# Patient Record
Sex: MALE | Race: WHITE | Employment: UNEMPLOYED | RURAL
[De-identification: names, ages, dates, MRNs, and addresses within clinical notes are randomized per-mention and may not be internally consistent; named-entity substitution may affect disease eponyms.]

---

## 2017-06-26 ENCOUNTER — OFFICE VISIT (OUTPATIENT)
Dept: FAMILY MEDICINE CLINIC | Age: 57
End: 2017-06-26

## 2017-06-26 VITALS
TEMPERATURE: 97.7 F | SYSTOLIC BLOOD PRESSURE: 146 MMHG | HEART RATE: 58 BPM | DIASTOLIC BLOOD PRESSURE: 87 MMHG | HEIGHT: 68 IN | WEIGHT: 172 LBS | OXYGEN SATURATION: 98 % | RESPIRATION RATE: 16 BRPM | BODY MASS INDEX: 26.07 KG/M2

## 2017-06-26 DIAGNOSIS — Z11.59 NEED FOR HEPATITIS C SCREENING TEST: ICD-10-CM

## 2017-06-26 DIAGNOSIS — I10 ESSENTIAL HYPERTENSION: Primary | ICD-10-CM

## 2017-06-26 DIAGNOSIS — K76.89 LIVER CYST: ICD-10-CM

## 2017-06-26 NOTE — PATIENT INSTRUCTIONS

## 2017-06-26 NOTE — MR AVS SNAPSHOT
Visit Information Date & Time Provider Department Dept. Phone Encounter #  
 6/26/2017  9:00 AM Nya Pinedo, [address] Taylor [id number] Upcoming Health Maintenance Date Due Hepatitis C Screening 1960 COLONOSCOPY 7/18/1978 DTaP/Tdap/Td series (1 - Tdap) 7/18/1981 INFLUENZA AGE 9 TO ADULT 8/1/2017 Allergies as of 6/26/2017  Review Complete On: 6/26/2017 By: Isamar Gaxiola LPN Severity Noted Reaction Type Reactions Codeine  02/02/2007    Nausea and Vomiting Current Immunizations  Reviewed on 11/11/2011 No immunizations on file. Not reviewed this visit You Were Diagnosed With   
  
 Codes Comments Essential hypertension    -  Primary ICD-10-CM: I10 
ICD-9-CM: 401.9 Liver cyst     ICD-10-CM: K76.89 
ICD-9-CM: 573.8 Need for hepatitis C screening test     ICD-10-CM: Z11.59 
ICD-9-CM: V73.89 Vitals BP Pulse Temp Resp Height(growth percentile) Weight(growth percentile) 146/87 (BP 1 Location: Right arm, BP Patient Position: Sitting) (!) 58 97.7 °F (36.5 °C) (Oral) 16 5' 8\" (1.727 m) 172 lb (78 kg) SpO2 BMI Smoking Status 98% 26.15 kg/m2 Never Smoker Vitals History BMI and BSA Data Body Mass Index Body Surface Area  
 26.15 kg/m 2 1.93 m 2 Preferred Pharmacy Pharmacy Name Phone [facility] [address] [place] [address] [phone] Your Updated Medication List  
  
   
This list is accurate as of: 6/26/17 10:16 AM.  Always use your most recent med list.  
  
  
  
  
 lisinopril 30 mg tablet Commonly known as:  Barbarann Plunk Take 1 Tab by mouth daily. We Performed the Following HEPATITIS C AB [37718 CPT(R)] METABOLIC PANEL, COMPREHENSIVE [30931 CPT(R)] To-Do List   
 06/26/2017 Imaging:  US ABD LTD Patient Instructions DASH Diet: Care Instructions Your Care Instructions The DASH diet is an eating plan that can help lower your blood pressure. DASH stands for Dietary Approaches to Stop Hypertension. Hypertension is high blood pressure. The DASH diet focuses on eating foods that are high in calcium, potassium, and magnesium. These nutrients can lower blood pressure. The foods that are highest in these nutrients are fruits, vegetables, low-fat dairy products, nuts, seeds, and legumes. But taking calcium, potassium, and magnesium supplements instead of eating foods that are high in those nutrients does not have the same effect. The DASH diet also includes whole grains, fish, and poultry. The DASH diet is one of several lifestyle changes your doctor may recommend to lower your high blood pressure. Your doctor may also want you to decrease the amount of sodium in your diet. Lowering sodium while following the DASH diet can lower blood pressure even further than just the DASH diet alone. Follow-up care is a key part of your treatment and safety. Be sure to make and go to all appointments, and call your doctor if you are having problems. It's also a good idea to know your test results and keep a list of the medicines you take. How can you care for yourself at home? Following the DASH diet · Eat 4 to 5 servings of fruit each day. A serving is 1 medium-sized piece of fruit, ½ cup chopped or canned fruit, 1/4 cup dried fruit, or 4 ounces (½ cup) of fruit juice. Choose fruit more often than fruit juice. · Eat 4 to 5 servings of vegetables each day. A serving is 1 cup of lettuce or raw leafy vegetables, ½ cup of chopped or cooked vegetables, or 4 ounces (½ cup) of vegetable juice. Choose vegetables more often than vegetable juice. · Get 2 to 3 servings of low-fat and fat-free dairy each day. A serving is 8 ounces of milk, 1 cup of yogurt, or 1 ½ ounces of cheese. · Eat 6 to 8 servings of grains each day.  A serving is 1 slice of bread, 1 ounce of dry cereal, or ½ cup of cooked rice, pasta, or cooked cereal. Try to choose whole-grain products as much as possible. · Limit lean meat, poultry, and fish to 2 servings each day. A serving is 3 ounces, about the size of a deck of cards. · Eat 4 to 5 servings of nuts, seeds, and legumes (cooked dried beans, lentils, and split peas) each week. A serving is 1/3 cup of nuts, 2 tablespoons of seeds, or ½ cup of cooked beans or peas. · Limit fats and oils to 2 to 3 servings each day. A serving is 1 teaspoon of vegetable oil or 2 tablespoons of salad dressing. · Limit sweets and added sugars to 5 servings or less a week. A serving is 1 tablespoon jelly or jam, ½ cup sorbet, or 1 cup of lemonade. · Eat less than 2,300 milligrams (mg) of sodium a day. If you limit your sodium to 1,500 mg a day, you can lower your blood pressure even more. Tips for success · Start small. Do not try to make dramatic changes to your diet all at once. You might feel that you are missing out on your favorite foods and then be more likely to not follow the plan. Make small changes, and stick with them. Once those changes become habit, add a few more changes. · Try some of the following: ¨ Make it a goal to eat a fruit or vegetable at every meal and at snacks. This will make it easy to get the recommended amount of fruits and vegetables each day. ¨ Try yogurt topped with fruit and nuts for a snack or healthy dessert. ¨ Add lettuce, tomato, cucumber, and onion to sandwiches. ¨ Combine a ready-made pizza crust with low-fat mozzarella cheese and lots of vegetable toppings. Try using tomatoes, squash, spinach, broccoli, carrots, cauliflower, and onions. ¨ Have a variety of cut-up vegetables with a low-fat dip as an appetizer instead of chips and dip. ¨ Sprinkle sunflower seeds or chopped almonds over salads. Or try adding chopped walnuts or almonds to cooked vegetables. ¨ Try some vegetarian meals using beans and peas. Add garbanzo or kidney beans to salads. Make burritos and tacos with mashed quiñones beans or black beans. Where can you learn more? Go to http://lorna-galilea.info/. Enter W584 in the search box to learn more about \"DASH Diet: Care Instructions. \" Current as of: April 3, 2017 Content Version: 11.3 © 7355-5163 [employer]. Care instructions adapted under license by [employer] (which disclaims liability or warranty for this information). If you have questions about a medical condition or this instruction, always ask your healthcare professional. [address] any warranty or liability for your use of this information. Introducing [facility]! Martha Quintero introduces [employer] patient portal. Now you can access parts of your medical record, email your doctor's office, and request medication refills online. 1. In your internet browser, go to [email or web address] 2. Click on the First Time User? Click Here link in the Sign In box. You will see the New Member Sign Up page. 3. Enter your [employer] Access Code exactly as it appears below. You will not need to use this code after youve completed the sign-up process. If you do not sign up before the expiration date, you must request a new code. · [employer] Access Code: [email or web address] Expires: 9/24/2017  8:57 AM 
 
4. Enter the last four digits of your Social Security Number (xxxx) and Date of Birth (mm/dd/yyyy) as indicated and click Submit. You will be taken to the next sign-up page. 5. Create a [employer] ID. This will be your [employer] login ID and cannot be changed, so think of one that is secure and easy to remember. 6. Create a [employer] password. You can change your password at any time. 7. Enter your Password Reset Question and Answer. This can be used at a later time if you forget your password. 8. Enter your e-mail address. You will receive e-mail notification when new information is available in [address]. 9. Click Sign Up. You can now view and download portions of your medical record. 10. Click the Download Summary menu link to download a portable copy of your medical information. If you have questions, please visit the Frequently Asked Questions section of the [employer] website. Remember, [employer] is NOT to be used for urgent needs. For medical emergencies, dial 911. Now available from your iPhone and Android! Please provide this summary of care documentation to your next provider. If you have any questions after today's visit, please call [phone].

## 2017-06-26 NOTE — PROGRESS NOTES
Reviewed record in preparation for visit and have necessary documentation  Pt did not bring medication to office visit for review  opportunity was given for questions  Goals that were addressed and/or need to be completed during or after this appointment include    Health Maintenance Due   Topic Date Due    Hepatitis C Screening  1960    COLONOSCOPY  07/18/1978    DTaP/Tdap/Td series (1 - Tdap) 07/18/1981

## 2017-06-27 LAB
ALBUMIN SERPL-MCNC: 4.3 G/DL (ref 3.5–5.5)
ALBUMIN/GLOB SERPL: 1.4 {RATIO} (ref 1.2–2.2)
ALP SERPL-CCNC: 53 IU/L (ref 39–117)
ALT SERPL-CCNC: 13 IU/L (ref 0–44)
AST SERPL-CCNC: 19 IU/L (ref 0–40)
BILIRUB SERPL-MCNC: 0.7 MG/DL (ref 0–1.2)
BUN SERPL-MCNC: 14 MG/DL (ref 6–24)
BUN/CREAT SERPL: 15 (ref 9–20)
CALCIUM SERPL-MCNC: 9.5 MG/DL (ref 8.7–10.2)
CHLORIDE SERPL-SCNC: 100 MMOL/L (ref 96–106)
CO2 SERPL-SCNC: 27 MMOL/L (ref 18–29)
CREAT SERPL-MCNC: 0.96 MG/DL (ref 0.76–1.27)
GLOBULIN SER CALC-MCNC: 3 G/DL (ref 1.5–4.5)
GLUCOSE SERPL-MCNC: 82 MG/DL (ref 65–99)
HCV AB S/CO SERPL IA: <0.1 S/CO RATIO (ref 0–0.9)
POTASSIUM SERPL-SCNC: 4.6 MMOL/L (ref 3.5–5.2)
PROT SERPL-MCNC: 7.3 G/DL (ref 6–8.5)
SODIUM SERPL-SCNC: 142 MMOL/L (ref 134–144)

## 2017-06-30 NOTE — PROGRESS NOTES
Patient: Leopold Fent MRN: [id number]  SSN: [id number]    YOB: 1960  Age: 64 y.o. Sex: male        Subjective:     Chief Complaint   Patient presents with    Hypertension    GERD    Kidney Stone     recently went to urology        HPI: he is a 64y.o. year old male who presents for follow up of HTN, GERD and hepatic cyst. Has had presumed hepatic cyst identified on CT. Patient denies HA, dizziness, SOB, CP, abdominal pain, dysuria, myalgias or arthralgias. Says he had procedure done many years ago on right flank. Encounter Diagnoses   Name Primary?  Essential hypertension Yes    Liver cyst     Need for hepatitis C screening test        BP Readings from Last 3 Encounters:   06/26/17 146/87   12/28/16 122/86   11/25/16 120/75       Wt Readings from Last 3 Encounters:   06/26/17 172 lb (78 kg)   12/28/16 173 lb 12.8 oz (78.8 kg)   11/25/16 175 lb (79.4 kg)     Body mass index is 26.15 kg/(m^2). Current and past medical information:    Current Medications after this visit[de-identified]     Current Outpatient Prescriptions   Medication Sig    lisinopril (PRINIVIL, ZESTRIL) 30 mg tablet Take 1 Tab by mouth daily. No current facility-administered medications for this visit. Patient Active Problem List    Diagnosis Date Noted    Vitamin D deficiency 11/20/2014    GERD (gastroesophageal reflux disease) 11/14/2012    HTN (hypertension) 05/24/2010       Past Medical History:   Diagnosis Date    HTN (hypertension) 5/24/2010       Allergies   Allergen Reactions    Codeine Nausea and Vomiting       No past surgical history on file.     Social History     Social History    Marital status:      Spouse name: N/A    Number of children: N/A    Years of education: N/A     Social History Main Topics    Smoking status: Never Smoker    Smokeless tobacco: Never Used    Alcohol use No    Drug use: No    Sexual activity: Not Asked     Other Topics Concern    None     Social History Narrative         Objective:     Review of Systems:  Constitutional: Negative for fatigue or malaise  Derm: Negative for rash or lesion  HEENT: Negative for acute hearing or vision changes  Cardiovascular: Negative for dizziness, chest pain or palpitations  Respiratory: Negative for cough, wheezing or SOB  Gastreintestinal: see HPI, Negative for nausea or abdominal pain  Genital/urinary: see HPI, Negative for dysuria or voiding dysfunction  Muscoloskeletal: Negative for acute myalgias or arthralgias   Neurological: Negative for headache, weakness or paresthesia  Psychological: Negative for depression or anxiety      Vitals:    06/26/17 0931 06/26/17 1015   BP: (!) 163/93 146/87   Pulse: (!) 58    Resp: 16    Temp: 97.7 °F (36.5 °C)    TempSrc: Oral    SpO2: 98%    Weight: 172 lb (78 kg)    Height: 5' 8\" (1.727 m)       Body mass index is 26.15 kg/(m^2). Physical Exam:  Constitutional: well developed, well nourished, in no acute distress  Skin: warm and dry, normal tone and turgor  Head: normocephalic, atraumatic  Eyes: sclera clear, EOMI, PERRL  Neck: normal range of motion  Cardiovascular: normal S1, S2, regular rate and rhythm  Respiratory: clear to auscultation bilaterally with symmetrical effort  Abdomen: soft, BS normal  Extremities: full range of motion  Neurology: normal strength and sensation  Psych: active, alert and oriented, affect appropriate       Health Maintenance Due   Topic Date Due    COLONOSCOPY  07/18/1978    DTaP/Tdap/Td series (1 - Tdap) 07/18/1981       Risk, benefits and potential costs of recommended health maintenance discussed. Patient expressed understanding and declined at this time. Assessment and orders:       ICD-10-CM ICD-9-CM    1. Essential hypertension C64 646.1 METABOLIC PANEL, COMPREHENSIVE   2. Liver cyst K76.89 573.8 US ABD LTD   3.  Need for hepatitis C screening test Z11.59 V73.89 HEPATITIS C AB         Plan of care:  Diagnoses were discussed in detail with patient. Medication risks/benefits/side effects discussed with patient. All of the patient's questions were addressed and answered to apparent satisfaction. The patient understands and agrees with our plan of care. The patient knows to call back if they have questions about the plan of care or if symptoms change. The patient received an After-Visit Summary which contains VS, diagnoses, orders, allergy and medication lists. Over half of the 25 minutes face to face with Brittani Boone consisted of counseling and discussing treatment plans in reference to his HTN, hx of renal calculi and hepatic cyst.    Patient Care Team:  Allen Allen MD as PCP - General (Family Practice)    Follow-up Disposition:  Return in about 6 months (around 12/26/2017), or if symptoms worsen or fail to improve. No future appointments.     Signed By: Allen Allen MD     June 30, 2017

## 2017-11-29 ENCOUNTER — OFFICE VISIT (OUTPATIENT)
Dept: FAMILY MEDICINE CLINIC | Age: 57
End: 2017-11-29

## 2017-11-29 VITALS
OXYGEN SATURATION: 97 % | RESPIRATION RATE: 20 BRPM | SYSTOLIC BLOOD PRESSURE: 127 MMHG | WEIGHT: 175.8 LBS | BODY MASS INDEX: 26.64 KG/M2 | TEMPERATURE: 98 F | HEART RATE: 64 BPM | HEIGHT: 68 IN | DIASTOLIC BLOOD PRESSURE: 84 MMHG

## 2017-11-29 DIAGNOSIS — Z00.00 PHYSICAL EXAM, ANNUAL: Primary | ICD-10-CM

## 2017-11-29 DIAGNOSIS — I10 ESSENTIAL HYPERTENSION: ICD-10-CM

## 2017-11-29 DIAGNOSIS — E55.9 VITAMIN D DEFICIENCY: ICD-10-CM

## 2017-11-29 DIAGNOSIS — Z12.11 SCREEN FOR COLON CANCER: ICD-10-CM

## 2017-11-29 RX ORDER — LISINOPRIL 30 MG/1
30 TABLET ORAL DAILY
Qty: 90 TAB | Refills: 1 | Status: SHIPPED | OUTPATIENT
Start: 2017-11-29 | End: 2018-05-16 | Stop reason: SDUPTHER

## 2017-11-29 NOTE — PROGRESS NOTES
Health Maintenance Due   Topic Date Due    COLONOSCOPY  07/18/1978    DTaP/Tdap/Td series (1 - Tdap) 07/18/1981    Influenza Age 5 to Adult  08/01/2017

## 2017-11-29 NOTE — PROGRESS NOTES
Patient: Annita Au MRN: [id number]  SSN: [id number]    YOB: 1960  Age: 62 y.o. Sex: male        Subjective:     Chief Complaint   Patient presents with    Well Male    Medication Refill       HPI: he is a 62y.o. year old male who presents for annual CPE for employer. Patient with hx of HTN. Patient feeling good sans other complaints or concerns at this time. Hypertension:  The patient reports:  taking medications as instructed, no medication side effects noted, no TIA's, no chest pain on exertion, no dyspnea on exertion, no swelling of ankles. BP Readings from Last 3 Encounters:   11/29/17 127/84   06/26/17 146/87   12/28/16 122/86     Patient advised to log blood pressures at home 2-3 times monthly and bring to next visit. Call office as soon as possible if BP's over 140/90 on multiple occasions or with symptoms of dizziness, chest pain, shortness of breath, headache or ankle swelling. Our goal is to normalize the blood pressure to decrease the risks of strokes and heart attacks. The patient is in agreement with the plan. Encounter Diagnoses   Name Primary?  Physical exam, annual Yes    Essential hypertension     Vitamin D deficiency     Screen for colon cancer        Current and past medical information:    Current Medications after this visit[de-identified]     Current Outpatient Prescriptions   Medication Sig    lisinopril (PRINIVIL, ZESTRIL) 30 mg tablet Take 1 Tab by mouth daily.  diph,pertuss,acel,,tetanus vac,PF, (ADACEL) 2 Lf-(2.5-5-3-5 mcg)-5Lf/0.5 mL syrg vaccine 0.5 mL by IntraMUSCular route once for 1 dose. No current facility-administered medications for this visit.         Patient Active Problem List    Diagnosis Date Noted    Vitamin D deficiency 11/20/2014    GERD (gastroesophageal reflux disease) 11/14/2012    HTN (hypertension) 05/24/2010       Past Medical History:   Diagnosis Date    HTN (hypertension) 5/24/2010       Allergies   Allergen Reactions  Codeine Nausea and Vomiting       History reviewed. No pertinent surgical history. Social History     Social History    Marital status:      Spouse name: N/A    Number of children: N/A    Years of education: N/A     Social History Main Topics    Smoking status: Never Smoker    Smokeless tobacco: Never Used    Alcohol use No    Drug use: No    Sexual activity: Not Asked     Other Topics Concern    None     Social History Narrative         Objective:     Review of Systems:  Constitutional: Negative for fatigue or malaise  Derm: Negative for rash or lesion  HEENT: Negative for acute hearing or vision changes  Cardiovascular: Negative for dizziness, chest pain or palpitations  Respiratory: Negative for cough, wheezing or SOB  Gastreintestinal: Negative for nausea or abdominal pain  Genital/urinary: Negative for dysuria or voiding dysfunction  Muscoloskeletal: Negative for acute myalgias or arthralgias   Neurological: Negative for headache, weakness or paresthesia  Psychological: Negative for depression or anxiety      Vitals:    11/29/17 0910   BP: 127/84   Pulse: 64   Resp: 20   Temp: 98 °F (36.7 °C)   TempSrc: Oral   SpO2: 97%   Weight: 175 lb 12.8 oz (79.7 kg)   Height: 5' 8\" (1.727 m)      Body mass index is 26.73 kg/(m^2).     Physical Exam:  Constitutional: well developed, well nourished, in no acute distress  Skin: warm and dry, normal tone and turgor  Head: normocephalic, atraumatic  Eyes: sclera clear, EOMI, PERRL  Neck: normal range of motion  Cardiovascular: normal S1, S2, regular rate and rhythm  Respiratory: clear to auscultation bilaterally with symmetrical effort  Abdomen: soft, BS normal  Extremities: full range of motion  Neurology: normal strength and sensation  Psych: active, alert and oriented, affect appropriate       Health Maintenance Due   Topic Date Due    COLONOSCOPY  07/18/1978    DTaP/Tdap/Td series (1 - Tdap) 07/18/1981    Influenza Age 5 to Adult  08/01/2017 Risk, benefits and potential costs of recommended health maintenance discussed. Patient expressed understanding and deferred at this time. Assessment and orders:       ICD-10-CM ICD-9-CM    1. Physical exam, annual Z00.00 V70.0    2. Essential hypertension I10 401.9 lisinopril (PRINIVIL, ZESTRIL) 30 mg tablet      LIPID PANEL      METABOLIC PANEL, COMPREHENSIVE      TSH 3RD GENERATION   3. Vitamin D deficiency E55.9 268.9 VITAMIN D, 25 HYDROXY   4. Screen for colon cancer Z12.11 V76.51 OCCULT BLOOD, IMMUNOASSAY (FIT)      CANCELED: OCCULT BLOOD, IMMUNOASSAY (FIT)         Plan of care:  Diagnoses were discussed in detail with patient. Medication risks/benefits/side effects discussed with patient. All of the patient's questions were addressed. The patient understands and agrees with our plan of care. The patient knows to call back if they are unsure of or forgets any changes we discussed today or if the symptoms change. The patient received an After-Visit Summary which contains VS, diagnoses, orders, allergy and medication lists. Patient Care Team:  Mattie Grimm MD as PCP - [facility])    Follow-up Disposition:  Return in about 6 months (around 5/29/2018). No future appointments.     Signed By: Mattie Grimm MD     November 29, 2017

## 2017-11-29 NOTE — MR AVS SNAPSHOT
Visit Information Date & Time Provider Department Dept. Phone Encounter #  
 11/29/2017  9:00 AM Alfreda Arzate [facility] [phone] [id number] Follow-up Instructions Return in about 6 months (around 5/29/2018). Upcoming Health Maintenance Date Due COLONOSCOPY 7/18/1978 DTaP/Tdap/Td series (1 - Tdap) 7/18/1981 Influenza Age 5 to Adult 8/1/2017 Allergies as of 11/29/2017  Review Complete On: 11/29/2017 By: Isela Veliz Severity Noted Reaction Type Reactions Codeine  02/02/2007    Nausea and Vomiting Current Immunizations  Reviewed on 11/11/2011 No immunizations on file. Not reviewed this visit You Were Diagnosed With   
  
 Codes Comments Physical exam, annual    -  Primary ICD-10-CM: Z00.00 ICD-9-CM: V70.0 Essential hypertension     ICD-10-CM: I10 
ICD-9-CM: 401.9 Vitamin D deficiency     ICD-10-CM: E55.9 ICD-9-CM: 268.9 Screen for colon cancer     ICD-10-CM: Z12.11 ICD-9-CM: V76.51 Vitals BP Pulse Temp Resp Height(growth percentile) Weight(growth percentile) 127/84 64 98 °F (36.7 °C) (Oral) 20 5' 8\" (1.727 m) 175 lb 12.8 oz (79.7 kg) SpO2 BMI Smoking Status 97% 26.73 kg/m2 Never Smoker Vitals History BMI and BSA Data Body Mass Index Body Surface Area  
 26.73 kg/m 2 1.96 m 2 Preferred Pharmacy Pharmacy Name Phone [facility] [address] [place] [address] [phone] Your Updated Medication List  
  
   
This list is accurate as of: 11/29/17  9:48 AM.  Always use your most recent med list.  
  
  
  
  
 diph,pertuss(acel),tetanus vac(PF) 2 Lf-(2.5-5-3-5 mcg)-5Lf/0.5 mL Syrg vaccine Commonly known as:  ADACEL  
0.5 mL by IntraMUSCular route once for 1 dose. lisinopril 30 mg tablet Commonly known as:  Mirtha Loss Take 1 Tab by mouth daily. Prescriptions Printed Refills diph,pertuss,acel,,tetanus vac,PF, (ADACEL) 2 Lf-(2.5-5-3-5 mcg)-5Lf/0.5 mL syrg vaccine 0 Si.5 mL by IntraMUSCular route once for 1 dose. Class: Print Route: IntraMUSCular Prescriptions Sent to Pharmacy Refills  
 lisinopril (PRINIVIL, ZESTRIL) 30 mg tablet 1 Sig: Take 1 Tab by mouth daily. Class: Normal  
 Pharmacy: [address], [address] #: [phone] Route: Oral  
  
We Performed the Following LIPID PANEL [05198 CPT(R)] METABOLIC PANEL, COMPREHENSIVE [98953 CPT(R)] OCCULT BLOOD, IMMUNOASSAY (FIT) [id number] CPT(R)] TSH 3RD GENERATION [10788 CPT(R)] VITAMIN D, 25 HYDROXY [id number] CPT(R)] Follow-up Instructions Return in about 6 months (around 2018). Patient Instructions Fecal Immunochemical Test (FIT): About This Test 
What is it? A fecal immunochemical test, or FIT, checks for hidden blood in the stool. Your doctor gives you a kit that contains everything you need. At home, you follow simple steps to collect a small amount of stool. You return the kit to the doctor or to a lab. Why is this test done? This test is done to check for colorectal cancer and other types of gastrointestinal problems, such as hemorrhoids, anal fissures, and colon polyps, that can cause blood in the stools. How can you prepare for the test? 
· Don't do the test during your menstrual period or if you are having bleeding from hemorrhoids. What happens during the test? 
There are different types of home tests available. It is important to follow the instructions provided with any test. 
Here are some general instructions: 
· Check the expiration date on the package. Don't use a test kit after its expiration date. · Follow the instructions exactly. Do all the steps, in order, without skipping any of them.  
· After you finish your test, follow the instructions that you were given for returning the test. 
There is a FIT test that shows the results right away. If your test shows that blood was found in your stool sample, call your doctor as soon as possible. Follow-up care is a key part of your treatment and safety. Be sure to make and go to all appointments, and call your doctor if you are having problems. It's also a good idea to keep a list of the medicines you take. Ask your doctor when you can expect to have your test results. Where can you learn more? Go to http://lorna-galilea.info/. Enter Z128 in the search box to learn more about \"Fecal Immunochemical Test (FIT): About This Test.\" Current as of: May 12, 2017 Content Version: 11.4 © 3544-4019 Healthwise, Incorporated. Care instructions adapted under license by [employer]oph[employer] (which disclaims liability or warranty for this information). If you have questions about a medical condition or this instruction, always ask your healthcare professional. [place] [address] any warranty or liability for your use of this information. Introducing [facility]! [employer] introduces Aisle50 patient portal. Now you can access parts of your medical record, email your doctor's office, and request medication refills online. 1. In your internet browser, go to [email or web address] 2. Click on the First Time User? Click Here link in the Sign In box. You will see the New Member Sign Up page. 3. Enter your Aisle50 Access Code exactly as it appears below. You will not need to use this code after youve completed the sign-up process. If you do not sign up before the expiration date, you must request a new code. · Aisle50 Access Code: [email or web address] Expires: 2/27/2018  9:43 AM 
 
4. Enter the last four digits of your Social Security Number (xxxx) and Date of Birth (mm/dd/yyyy) as indicated and click Submit. You will be taken to the next sign-up page. 5. Create a ADTELLIGENCE ID. This will be your ADTELLIGENCE login ID and cannot be changed, so think of one that is secure and easy to remember. 6. Create a ADTELLIGENCE password. You can change your password at any time. 7. Enter your Password Reset Question and Answer. This can be used at a later time if you forget your password. 8. Enter your e-mail address. You will receive e-mail notification when new information is available in [address] 9. Click Sign Up. You can now view and download portions of your medical record. 10. Click the Download Summary menu link to download a portable copy of your medical information. If you have questions, please visit the Frequently Asked Questions section of the ADTELLIGENCE website. Remember, ADTELLIGENCE is NOT to be used for urgent needs. For medical emergencies, dial 911. Now available from your iPhone and Android! Please provide this summary of care documentation to your next provider. Your primary care clinician is listed as Jerald Mercado. If you have any questions after today's visit, please call [phone].

## 2017-11-29 NOTE — PATIENT INSTRUCTIONS
Fecal Immunochemical Test (FIT): About This Test  What is it? A fecal immunochemical test, or FIT, checks for hidden blood in the stool. Your doctor gives you a kit that contains everything you need. At home, you follow simple steps to collect a small amount of stool. You return the kit to the doctor or to a lab. Why is this test done? This test is done to check for colorectal cancer and other types of gastrointestinal problems, such as hemorrhoids, anal fissures, and colon polyps, that can cause blood in the stools. How can you prepare for the test?  · Don't do the test during your menstrual period or if you are having bleeding from hemorrhoids. What happens during the test?  There are different types of home tests available. It is important to follow the instructions provided with any test.  Here are some general instructions:  · Check the expiration date on the package. Don't use a test kit after its expiration date. · Follow the instructions exactly. Do all the steps, in order, without skipping any of them. · After you finish your test, follow the instructions that you were given for returning the test.  There is a FIT test that shows the results right away. If your test shows that blood was found in your stool sample, call your doctor as soon as possible. Follow-up care is a key part of your treatment and safety. Be sure to make and go to all appointments, and call your doctor if you are having problems. It's also a good idea to keep a list of the medicines you take. Ask your doctor when you can expect to have your test results. Where can you learn more? Go to http://lorna-galilea.info/. Enter K923 in the search box to learn more about \"Fecal Immunochemical Test (FIT): About This Test.\"  Current as of: May 12, 2017  Content Version: 11.4  © 8445-1865 [employer].  Care instructions adapted under license by [employer]i [employer] (which disclaims liability or warranty for this information). If you have questions about a medical condition or this instruction, always ask your healthcare professional. [place] [address] any warranty or liability for your use of this information.

## 2017-11-30 LAB
25(OH)D3+25(OH)D2 SERPL-MCNC: 53.2 NG/ML (ref 30–100)
ALBUMIN SERPL-MCNC: 4.4 G/DL (ref 3.5–5.5)
ALBUMIN/GLOB SERPL: 1.5 {RATIO} (ref 1.2–2.2)
ALP SERPL-CCNC: 54 IU/L (ref 39–117)
ALT SERPL-CCNC: 16 IU/L (ref 0–44)
AST SERPL-CCNC: 18 IU/L (ref 0–40)
BILIRUB SERPL-MCNC: 0.8 MG/DL (ref 0–1.2)
BUN SERPL-MCNC: 12 MG/DL (ref 6–24)
BUN/CREAT SERPL: 12 (ref 9–20)
CALCIUM SERPL-MCNC: 9.5 MG/DL (ref 8.7–10.2)
CHLORIDE SERPL-SCNC: 101 MMOL/L (ref 96–106)
CHOLEST SERPL-MCNC: 143 MG/DL (ref 100–199)
CO2 SERPL-SCNC: 27 MMOL/L (ref 18–29)
CREAT SERPL-MCNC: 0.98 MG/DL (ref 0.76–1.27)
GFR SERPLBLD CREATININE-BSD FMLA CKD-EPI: 85 ML/MIN/1.73
GFR SERPLBLD CREATININE-BSD FMLA CKD-EPI: 99 ML/MIN/1.73
GLOBULIN SER CALC-MCNC: 2.9 G/DL (ref 1.5–4.5)
GLUCOSE SERPL-MCNC: 81 MG/DL (ref 65–99)
HDLC SERPL-MCNC: 44 MG/DL
LDLC SERPL CALC-MCNC: 85 MG/DL (ref 0–99)
POTASSIUM SERPL-SCNC: 4.9 MMOL/L (ref 3.5–5.2)
PROT SERPL-MCNC: 7.3 G/DL (ref 6–8.5)
SODIUM SERPL-SCNC: 143 MMOL/L (ref 134–144)
TRIGL SERPL-MCNC: 72 MG/DL (ref 0–149)
TSH SERPL DL<=0.005 MIU/L-ACNC: 2.07 UIU/ML (ref 0.45–4.5)
VLDLC SERPL CALC-MCNC: 14 MG/DL (ref 5–40)

## 2017-12-02 LAB — HEMOCCULT STL QL IA: NEGATIVE

## 2018-06-18 ENCOUNTER — OFFICE VISIT (OUTPATIENT)
Dept: FAMILY MEDICINE CLINIC | Age: 58
End: 2018-06-18

## 2018-06-18 VITALS
WEIGHT: 172.8 LBS | OXYGEN SATURATION: 99 % | RESPIRATION RATE: 18 BRPM | TEMPERATURE: 97.3 F | HEART RATE: 71 BPM | BODY MASS INDEX: 26.19 KG/M2 | HEIGHT: 68 IN | DIASTOLIC BLOOD PRESSURE: 85 MMHG | SYSTOLIC BLOOD PRESSURE: 126 MMHG

## 2018-06-18 DIAGNOSIS — N30.00 ACUTE CYSTITIS WITHOUT HEMATURIA: Primary | ICD-10-CM

## 2018-06-18 LAB
BILIRUB UR QL STRIP: NEGATIVE
GLUCOSE UR-MCNC: NEGATIVE MG/DL
KETONES P FAST UR STRIP-MCNC: NEGATIVE MG/DL
PH UR STRIP: 7 [PH] (ref 4.6–8)
PROT UR QL STRIP: NEGATIVE
SP GR UR STRIP: 1.01 (ref 1–1.03)
UA UROBILINOGEN AMB POC: NORMAL (ref 0.2–1)
URINALYSIS CLARITY POC: CLEAR
URINALYSIS COLOR POC: YELLOW
URINE BLOOD POC: NEGATIVE
URINE LEUKOCYTES POC: NEGATIVE
URINE NITRITES POC: NEGATIVE

## 2018-06-18 RX ORDER — CIPROFLOXACIN 500 MG/1
TABLET ORAL
Refills: 0 | COMMUNITY
Start: 2018-06-12 | End: 2018-11-05

## 2018-06-18 NOTE — PROGRESS NOTES
Reviewed record in preparation for visit and have necessary documentation  Pt did not bring medication to office visit for review  Goals that were addressed and/or need to be completed during or after this appointment include   Health Maintenance Due   Topic Date Due    COLONOSCOPY  07/18/1978    DTaP/Tdap/Td series (1 - Tdap) 07/18/1981

## 2018-06-28 NOTE — PATIENT INSTRUCTIONS
Male Urinary Tract: Anatomy Sketch    Current as of: March 14, 2017  Content Version: 11.4  © 3263-5003 Healthwise, [employer]. Care instructions adapted under license by [employer] (which disclaims liability or warranty for this information). If you have questions about a medical condition or this instruction, always ask your healthcare professional. [place] [address] any warranty or liability for your use of this information.

## 2018-06-28 NOTE — PROGRESS NOTES
Patient: Freeman Hester MRN: [id number]  SSN: [id number]    YOB: 1960  Age: 62 y.o. Sex: male      Chief Complaint   Patient presents with   [facility] Follow Up     Freeman Hester is a 62 y.o. male who presents for follow up of UTI identified in ED. He is taking antibiotic as prescribed. Patient without flank pain, fever, chills, nausea or vomiting, abnormal penile discharge or bleeding. Patient does not have a history of UTI. Patient does not have a history of pyelonephritis. Medications:     Current Outpatient Prescriptions   Medication Sig    ciprofloxacin HCl (CIPRO) 500 mg tablet TAKE ONE TABLET BY MOUTH TWICE DAILY FOR 10 DAYS    lisinopril (PRINIVIL, ZESTRIL) 30 mg tablet TAKE ONE TABLET BY MOUTH ONCE DAILY     No current facility-administered medications for this visit. Problem List:     Patient Active Problem List    Diagnosis Date Noted    Vitamin D deficiency 11/20/2014    GERD (gastroesophageal reflux disease) 11/14/2012    HTN (hypertension) 05/24/2010       Medical History:     Past Medical History:   Diagnosis Date    HTN (hypertension) 5/24/2010       Allergies: Allergies   Allergen Reactions    Codeine Nausea and Vomiting       Surgical History:   History reviewed. No pertinent surgical history.     Social History:     Social History     Social History    Marital status:      Spouse name: N/A    Number of children: N/A    Years of education: N/A     Social History Main Topics    Smoking status: Never Smoker    Smokeless tobacco: Never Used    Alcohol use No    Drug use: No    Sexual activity: Not Asked     Other Topics Concern    None     Social History Narrative       Review of Symptoms:  Constitutional: No fever, chills, fatigue, malaise  Cardiovascular: Negative for chest pain or palpitations  Respiratory: Negative for cough, wheezing or SOB  Gastrointestinal: Negative for nausea or abdominal pain  Genital/urinary: see HPI  Neurological: Negative for weakness or paresthesia     Vitals:    06/18/18 1339   BP: 126/85   Pulse: 71   Resp: 18   Temp: 97.3 °F (36.3 °C)   TempSrc: Oral   SpO2: 99%   Weight: 172 lb 12.8 oz (78.4 kg)   Height: 5' 8\" (1.727 m)        Physical Examination:  General: Appears well, in no apparent distress. Eyes: Sclera clear  Cardiovascular: Regular rate and rhythm  Respiratory: Symmetrical, unlabored effort  Abdomen: Soft without tenderness. Back: No CVA tenderness  Extremities: Full range of motion  Neuro: Active, alert, and oriented    Urine dipstick shows negative for all components. ASSESSMENT:   Diagnoses and all orders for this visit:    1. Acute cystitis without hematuria  -     AMB POC URINALYSIS DIP STICK AUTO W/O MICRO        Push fluids. Discussed expected course, resolution and possible complications of diagnosis in detail with patient. Goals of treatment  were discussed. All of the patient's questions were addressed. The patient expresses understanding and agreement with our plan of care. The patient has received an after-visit summary and questions were answered concerning future plans. I have discussed medication side effects and warnings with the patient as well. Follow-up Disposition:  Return if symptoms worsen or fail to improve.

## 2018-11-05 ENCOUNTER — OFFICE VISIT (OUTPATIENT)
Dept: FAMILY MEDICINE CLINIC | Age: 58
End: 2018-11-05

## 2018-11-05 VITALS
RESPIRATION RATE: 20 BRPM | HEIGHT: 68 IN | TEMPERATURE: 97.4 F | BODY MASS INDEX: 26.67 KG/M2 | WEIGHT: 176 LBS | OXYGEN SATURATION: 96 % | SYSTOLIC BLOOD PRESSURE: 143 MMHG | HEART RATE: 64 BPM | DIASTOLIC BLOOD PRESSURE: 89 MMHG

## 2018-11-05 DIAGNOSIS — I10 ESSENTIAL HYPERTENSION: ICD-10-CM

## 2018-11-05 DIAGNOSIS — Z00.00 ANNUAL PHYSICAL EXAM: Primary | ICD-10-CM

## 2018-11-05 RX ORDER — LISINOPRIL 30 MG/1
TABLET ORAL
Qty: 90 TAB | Refills: 2 | Status: SHIPPED | OUTPATIENT
Start: 2018-11-05 | End: 2019-08-08 | Stop reason: SDUPTHER

## 2018-11-05 NOTE — PATIENT INSTRUCTIONS
Learning About High Blood Pressure What is high blood pressure? Blood pressure is a measure of how hard the blood pushes against the walls of your arteries. It's normal for blood pressure to go up and down throughout the day, but if it stays up, you have high blood pressure. Another name for high blood pressure is hypertension. Two numbers tell you your blood pressure. The first number is the systolic pressure. It shows how hard the blood pushes when your heart is pumping. The second number is the diastolic pressure. It shows how hard the blood pushes between heartbeats, when your heart is relaxed and filling with blood. A blood pressure of less than 120/80 (say \"120 over 80\") is ideal for an adult. High blood pressure is 130/80 or higher. You have high blood pressure if your top number is 130 or higher or your bottom number is 80 or higher, or both. What happens when you have high blood pressure? · Blood flows through your arteries with too much force. Over time, this damages the walls of your arteries. But you can't feel it. High blood pressure usually doesn't cause symptoms. · Fat and calcium start to build up in your arteries. This buildup is called plaque. Plaque makes your arteries narrower and stiffer. Blood can't flow through them as easily. · This lack of good blood flow starts to damage some of the organs in your body. This can lead to problems such as coronary artery disease and heart attack, heart failure, stroke, kidney failure, and eye damage. How can you prevent high blood pressure? · Stay at a healthy weight. · Try to limit how much sodium you eat to less than 2,300 milligrams (mg) a day. If you limit your sodium to 1,500 mg a day, you can lower your blood pressure even more. ? Buy foods that are labeled \"unsalted,\" \"sodium-free,\" or \"low-sodium. \" Foods labeled \"reduced-sodium\" and \"light sodium\" may still have too much sodium. ? Flavor your food with garlic, lemon juice, onion, vinegar, herbs, and spices instead of salt. Do not use soy sauce, steak sauce, onion salt, garlic salt, mustard, or ketchup on your food. ? Use less salt (or none) when recipes call for it. You can often use half the salt a recipe calls for without losing flavor. · Be physically active. Get at least 30 minutes of exercise on most days of the week. Walking is a good choice. You also may want to do other activities, such as running, swimming, cycling, or playing tennis or team sports. · Limit alcohol to 2 drinks a day for men and 1 drink a day for women. · Eat plenty of fruits, vegetables, and low-fat dairy products. Eat less saturated and total fats. How is high blood pressure treated? · Your doctor will suggest making lifestyle changes. For example, your doctor may ask you to eat healthy foods, quit smoking, lose extra weight, and be more active. · If lifestyle changes don't help enough, your doctor may recommend that you take medicine. · When blood pressure is very high, medicines are needed to lower it. Follow-up care is a key part of your treatment and safety. Be sure to make and go to all appointments, and call your doctor if you are having problems. It's also a good idea to know your test results and keep a list of the medicines you take. Where can you learn more? Go to http://lorna-galilea.info/. Enter P501 in the search box to learn more about \"Learning About High Blood Pressure. \" Current as of: December 6, 2017 Content Version: 11.8 © 2297-4329 [employer]. Care instructions adapted under license by "[employer] (which disclaims liability or warranty for this information). If you have questions about a medical condition or this instruction, always ask your healthcare professional. [address] any warranty or liability for your use of this information.

## 2018-11-05 NOTE — PROGRESS NOTES
1. Have you been to the ER, urgent care clinic since your last visit? Hospitalized since your last visit? No 
 
2. Have you seen or consulted any other health care providers outside of the [facility] since your last visit? Include any pap smears or colon screening. No 
Reviewed record in preparation for visit and have necessary documentation Pt did not bring medication to office visit for review Information was given to pt on Advanced Directives, Living Will Information was given on Shingles Vaccine 
opportunity was given for questions Goals that were addressed and/or need to be completed during or after this appointment include Health Maintenance Due Topic Date Due  
 COLONOSCOPY  07/18/1978  DTaP/Tdap/Td series (1 - Tdap) 07/18/1981  Shingrix Vaccine Age 50> (1 of 2) 07/18/2010  Influenza Age 5 to Adult  08/01/2018

## 2018-11-05 NOTE — LETTER
11/6/2018 8:17 AM 
 
Mr. Edison Shipley [address] [address] Dear Edison Shipley: Please find your most recent results below. All results in normal range. Resulted Orders MAGNESIUM Result Value Ref Range Magnesium 2.2 1.6 - 2.3 mg/dL Narrative Performed at:  [address]  [id number] : Randi Magdaleno MD, Phone:  [phone] METABOLIC PANEL, COMPREHENSIVE Result Value Ref Range Glucose 82 65 - 99 mg/dL BUN 13 6 - 24 mg/dL Creatinine 0.96 0.76 - 1.27 mg/dL GFR est non-AA 87 >59 mL/min/1.73 GFR est  >59 mL/min/1.73  
 BUN/Creatinine ratio 14 9 - 20 Sodium 144 134 - 144 mmol/L Potassium 4.7 3.5 - 5.2 mmol/L Chloride 101 96 - 106 mmol/L  
 CO2 24 20 - 29 mmol/L Calcium 9.9 8.7 - 10.2 mg/dL Protein, total 7.7 6.0 - 8.5 g/dL Albumin 4.6 3.5 - 5.5 g/dL GLOBULIN, TOTAL 3.1 1.5 - 4.5 g/dL A-G Ratio 1.5 1.2 - 2.2 Bilirubin, total 0.6 0.0 - 1.2 mg/dL Alk. phosphatase 61 39 - 117 IU/L  
 AST (SGOT) 20 0 - 40 IU/L  
 ALT (SGPT) 17 0 - 44 IU/L Narrative Performed at:  [address]  [id number] : Randi Magdaleno MD, Phone:  [phone] LIPID PANEL Result Value Ref Range Cholesterol, total 161 100 - 199 mg/dL Triglyceride 71 0 - 149 mg/dL HDL Cholesterol 49 >39 mg/dL VLDL, calculated 14 5 - 40 mg/dL LDL, calculated 98 0 - 99 mg/dL Narrative Performed at:  [address]  [id number] : Randi Magdaleno MD, Phone:  [phone] TSH 3RD GENERATION Result Value Ref Range TSH 2.090 0.450 - 4.500 uIU/mL Narrative Performed at:  [address]  [id number] : Randi Magdaleno MD, Phone:  [phone] CBC WITH AUTOMATED DIFF Result Value Ref Range WBC 5.2 3.4 - 10.8 x10E3/uL RBC 4.72 4.14 - 5.80 x10E6/uL HGB 14.7 13.0 - 17.7 g/dL HCT 43.5 37.5 - 51.0 % MCV 92 79 - 97 fL  
 MCH 31.1 26.6 - 33.0 pg  
 MCHC 33.8 31.5 - 35.7 g/dL  
 RDW 13.1 12.3 - 15.4 % PLATELET 195 634 - 186 x10E3/uL NEUTROPHILS 52 Not Estab. % Lymphocytes 39 Not Estab. % MONOCYTES 8 Not Estab. % EOSINOPHILS 1 Not Estab. % BASOPHILS 0 Not Estab. %  
 ABS. NEUTROPHILS 2.6 1.4 - 7.0 x10E3/uL Abs Lymphocytes 2.1 0.7 - 3.1 x10E3/uL  
 ABS. MONOCYTES 0.4 0.1 - 0.9 x10E3/uL  
 ABS. EOSINOPHILS 0.1 0.0 - 0.4 x10E3/uL  
 ABS. BASOPHILS 0.0 0.0 - 0.2 x10E3/uL IMMATURE GRANULOCYTES 0 Not Estab. %  
 ABS. IMM. GRANS. 0.0 0.0 - 0.1 x10E3/uL Narrative Performed at:  [address]  [id number] : Qamar Hardy MD, Phone:  [phone] RECOMMENDATIONS: Continue to monitor blood pressure as discussed at office visit. Please call me if you have any questions: [phone] Sincerely, Jose Lopez MD

## 2018-11-06 LAB
ALBUMIN SERPL-MCNC: 4.6 G/DL (ref 3.5–5.5)
ALBUMIN/GLOB SERPL: 1.5 {RATIO} (ref 1.2–2.2)
ALP SERPL-CCNC: 61 IU/L (ref 39–117)
ALT SERPL-CCNC: 17 IU/L (ref 0–44)
AST SERPL-CCNC: 20 IU/L (ref 0–40)
BASOPHILS # BLD AUTO: 0 X10E3/UL (ref 0–0.2)
BASOPHILS NFR BLD AUTO: 0 %
BILIRUB SERPL-MCNC: 0.6 MG/DL (ref 0–1.2)
BUN SERPL-MCNC: 13 MG/DL (ref 6–24)
BUN/CREAT SERPL: 14 (ref 9–20)
CALCIUM SERPL-MCNC: 9.9 MG/DL (ref 8.7–10.2)
CHLORIDE SERPL-SCNC: 101 MMOL/L (ref 96–106)
CHOLEST SERPL-MCNC: 161 MG/DL (ref 100–199)
CO2 SERPL-SCNC: 24 MMOL/L (ref 20–29)
CREAT SERPL-MCNC: 0.96 MG/DL (ref 0.76–1.27)
EOSINOPHIL # BLD AUTO: 0.1 X10E3/UL (ref 0–0.4)
EOSINOPHIL NFR BLD AUTO: 1 %
ERYTHROCYTE [DISTWIDTH] IN BLOOD BY AUTOMATED COUNT: 13.1 % (ref 12.3–15.4)
GLOBULIN SER CALC-MCNC: 3.1 G/DL (ref 1.5–4.5)
GLUCOSE SERPL-MCNC: 82 MG/DL (ref 65–99)
HCT VFR BLD AUTO: 43.5 % (ref 37.5–51)
HDLC SERPL-MCNC: 49 MG/DL
HGB BLD-MCNC: 14.7 G/DL (ref 13–17.7)
IMM GRANULOCYTES # BLD: 0 X10E3/UL (ref 0–0.1)
IMM GRANULOCYTES NFR BLD: 0 %
LDLC SERPL CALC-MCNC: 98 MG/DL (ref 0–99)
LYMPHOCYTES # BLD AUTO: 2.1 X10E3/UL (ref 0.7–3.1)
LYMPHOCYTES NFR BLD AUTO: 39 %
MAGNESIUM SERPL-MCNC: 2.2 MG/DL (ref 1.6–2.3)
MCH RBC QN AUTO: 31.1 PG (ref 26.6–33)
MCHC RBC AUTO-ENTMCNC: 33.8 G/DL (ref 31.5–35.7)
MCV RBC AUTO: 92 FL (ref 79–97)
MONOCYTES # BLD AUTO: 0.4 X10E3/UL (ref 0.1–0.9)
MONOCYTES NFR BLD AUTO: 8 %
NEUTROPHILS # BLD AUTO: 2.6 X10E3/UL (ref 1.4–7)
NEUTROPHILS NFR BLD AUTO: 52 %
PLATELET # BLD AUTO: 207 X10E3/UL (ref 150–379)
POTASSIUM SERPL-SCNC: 4.7 MMOL/L (ref 3.5–5.2)
PROT SERPL-MCNC: 7.7 G/DL (ref 6–8.5)
RBC # BLD AUTO: 4.72 X10E6/UL (ref 4.14–5.8)
SODIUM SERPL-SCNC: 144 MMOL/L (ref 134–144)
TRIGL SERPL-MCNC: 71 MG/DL (ref 0–149)
TSH SERPL DL<=0.005 MIU/L-ACNC: 2.09 UIU/ML (ref 0.45–4.5)
VLDLC SERPL CALC-MCNC: 14 MG/DL (ref 5–40)
WBC # BLD AUTO: 5.2 X10E3/UL (ref 3.4–10.8)

## 2018-11-11 NOTE — PROGRESS NOTES
Patient: Bharti Negro MRN: [id number]  SSN: [id number] YOB: 1960  Age: 62 y.o. Sex: male Subjective: Chief Complaint Patient presents with  Complete Physical  
 
 
HPI: he is a 62y.o. year old male who presents for annual CPE for employer. Patient with hx of HTN. Patient feeling good sans other complaints or concerns at this time. Hypertension: The patient reports that he is taking medications as instructed, no medication side effects noted, no TIA's, no chest pain on exertion, no dyspnea on exertion, no swelling of ankles. BP Readings from Last 3 Encounters:  
11/05/18 143/89  
06/18/18 126/85  
11/29/17 127/84 Lab Results Component Value Date/Time Sodium 144 11/05/2018 03:23 PM  
 Potassium 4.7 11/05/2018 03:23 PM  
 Chloride 101 11/05/2018 03:23 PM  
 CO2 24 11/05/2018 03:23 PM  
 Anion gap 9 09/28/2010 10:09 AM  
 Glucose 82 11/05/2018 03:23 PM  
 BUN 13 11/05/2018 03:23 PM  
 Creatinine 0.96 11/05/2018 03:23 PM  
 BUN/Creatinine ratio 14 11/05/2018 03:23 PM  
 GFR est  11/05/2018 03:23 PM  
 GFR est non-AA 87 11/05/2018 03:23 PM  
 Calcium 9.9 11/05/2018 03:23 PM  
 Bilirubin, total 0.6 11/05/2018 03:23 PM  
 ALT (SGPT) 17 11/05/2018 03:23 PM  
 AST (SGOT) 20 11/05/2018 03:23 PM  
 Alk. phosphatase 61 11/05/2018 03:23 PM  
 Protein, total 7.7 11/05/2018 03:23 PM  
 Albumin 4.6 11/05/2018 03:23 PM  
 Globulin 3.3 09/28/2010 10:09 AM  
 A-G Ratio 1.5 11/05/2018 03:23 PM  
  
 
 
Patient advised to log blood pressures at home 2-3 times monthly and bring to next visit. Call office as soon as possible if BP's over 140/90 on multiple occasions or with symptoms of dizziness, chest pain, shortness of breath, headache or ankle swelling. Our goal is to normalize the blood pressure to decrease the risks of strokes and heart attacks. The patient is in agreement with the plan. Encounter Diagnoses Name Primary?  Annual physical exam Yes  Essential hypertension Current and past medical information: 
 
Current Medications after this visit[de-identified]    
Current Outpatient Medications Medication Sig  
 lisinopril (PRINIVIL, ZESTRIL) 30 mg tablet TAKE 1 TABLET BY MOUTH ONCE DAILY No current facility-administered medications for this visit. Patient Active Problem List  
 Diagnosis Date Noted  Vitamin D deficiency 11/20/2014  GERD (gastroesophageal reflux disease) 11/14/2012  
 HTN (hypertension) 05/24/2010 Past Medical History:  
Diagnosis Date  
 HTN (hypertension) 5/24/2010 Allergies Allergen Reactions  Codeine Nausea and Vomiting History reviewed. No pertinent surgical history. Social History Socioeconomic History  Marital status:  Spouse name: Not on file  Number of children: Not on file  Years of education: Not on file  Highest education level: Not on file Social Needs  Financial resource strain: Not on file  Food insecurity - worry: Not on file  Food insecurity - inability: Not on file  Transportation needs - medical: Not on file  Transportation needs - non-medical: Not on file Occupational History  Not on file Tobacco Use  Smoking status: Never Smoker  Smokeless tobacco: Never Used Substance and Sexual Activity  Alcohol use: No  
 Drug use: No  
 Sexual activity: Not on file Other Topics Concern  Not on file Social History Narrative  Not on file Objective:  
 
Review of Systems: 
Constitutional: Negative for fatigue or malaise Derm: Negative for rash or lesion HEENT: Negative for acute hearing or vision changes Cardiovascular: Negative for dizziness, chest pain or palpitations Respiratory: Negative for cough, wheezing or SOB Gastreintestinal: Negative for nausea or abdominal pain Genital/urinary: Negative for dysuria or voiding dysfunction Muscoloskeletal: Negative for acute myalgias or arthralgias Neurological: Negative for headache, weakness or paresthesia Psychological: Negative for depression or anxiety Vitals:  
 11/05/18 0928 11/05/18 1021 BP: (!) 152/100 143/89 Pulse: 64 Resp: 20 Temp: 97.4 °F (36.3 °C) TempSrc: Oral   
SpO2: 96% Weight: 176 lb (79.8 kg) Height: 5' 8\" (1.727 m) Body mass index is 26.76 kg/m². Physical Exam: 
Constitutional: well developed, well nourished, in no acute distress Skin: warm and dry, normal tone and turgor Head: normocephalic, atraumatic Eyes: sclera clear, EOMI, PERRL Neck: normal range of motion Cardiovascular: normal S1, S2, regular rate and rhythm Respiratory: clear to auscultation bilaterally with symmetrical effort Abdomen: soft, BS normal 
Extremities: full range of motion Neurology: normal strength and sensation Psych: active, alert and oriented, affect appropriate Health Maintenance Due Topic Date Due  
 COLONOSCOPY  07/18/1978  DTaP/Tdap/Td series (1 - Tdap) 07/18/1981  Shingrix Vaccine Age 50> (1 of 2) 07/18/2010 Risk, benefits and potential costs of recommended health maintenance discussed. Patient expressed understanding and deferred at this time. Assessment and orders: ICD-10-CM ICD-9-CM 1. Annual physical exam Z00.00 V70.0 MAGNESIUM  
   METABOLIC PANEL, COMPREHENSIVE  
   LIPID PANEL  
   TSH 3RD GENERATION  
   CBC WITH AUTOMATED DIFF AMB POC EKG ROUTINE W/ 12 LEADS, INTER & REP 2. Essential hypertension I10 401.9 lisinopril (PRINIVIL, ZESTRIL) 30 mg tablet AMB POC EKG ROUTINE W/ 12 LEADS, INTER & REP Plan of care: 
Diagnoses were discussed in detail with patient. Medication risks/benefits/side effects discussed with patient. All of the patient's questions were addressed. The patient understands and agrees with our plan of care. The patient knows to call back if they are unsure of or forgets any changes we discussed today or if the symptoms change. The patient received an After-Visit Summary which contains VS, diagnoses, orders, allergy and medication lists. Patient Care Team: 
Mely Marte MD as PCP - [facility]) Follow-up Disposition: 
Return in about 2 weeks (around 11/19/2018). No future appointments. Signed By: Emma Cornell MD   
 November 11, 2018

## 2019-05-03 ENCOUNTER — OFFICE VISIT (OUTPATIENT)
Dept: FAMILY MEDICINE CLINIC | Age: 59
End: 2019-05-03

## 2019-05-03 VITALS
HEIGHT: 68 IN | TEMPERATURE: 97.8 F | WEIGHT: 173 LBS | DIASTOLIC BLOOD PRESSURE: 87 MMHG | SYSTOLIC BLOOD PRESSURE: 133 MMHG | BODY MASS INDEX: 26.22 KG/M2 | RESPIRATION RATE: 16 BRPM | OXYGEN SATURATION: 99 % | HEART RATE: 68 BPM

## 2019-05-03 DIAGNOSIS — R07.89 OTHER CHEST PAIN: Primary | ICD-10-CM

## 2019-05-03 DIAGNOSIS — I10 ESSENTIAL HYPERTENSION: ICD-10-CM

## 2019-05-03 DIAGNOSIS — Z63.4 SPOUSE DECEASED: ICD-10-CM

## 2019-05-03 RX ORDER — ACETAMINOPHEN 500 MG
TABLET ORAL
Qty: 1 KIT | Refills: 0 | Status: SHIPPED | OUTPATIENT
Start: 2019-05-03

## 2019-05-03 SDOH — SOCIAL STABILITY - SOCIAL INSECURITY: DISSAPEARANCE AND DEATH OF FAMILY MEMBER: Z63.4

## 2019-05-03 NOTE — PROGRESS NOTES
1. Have you been to the ER, urgent care clinic since your last visit? Hospitalized since your last visit? No 
 
2. Have you seen or consulted any other health care providers outside of the [address] [place] [address] since your last visit? Include any pap smears or colon screening. No 
Reviewed record in preparation for visit and have necessary documentation Pt did not bring medication to office visit for review 
opportunity was given for questions Goals that were addressed and/or need to be completed during or after this appointment include Health Maintenance Due Topic Date Due  
 COLONOSCOPY  07/18/1978  DTaP/Tdap/Td series (1 - Tdap) 07/18/1981  Shingrix Vaccine Age 50> (1 of 2) 07/18/2010

## 2019-05-04 LAB
ALBUMIN SERPL-MCNC: 4.4 G/DL (ref 3.5–5.5)
ALBUMIN/GLOB SERPL: 1.6 {RATIO} (ref 1.2–2.2)
ALP SERPL-CCNC: 56 IU/L (ref 39–117)
ALT SERPL-CCNC: 16 IU/L (ref 0–44)
AST SERPL-CCNC: 18 IU/L (ref 0–40)
BILIRUB SERPL-MCNC: 1 MG/DL (ref 0–1.2)
BUN SERPL-MCNC: 11 MG/DL (ref 6–24)
BUN/CREAT SERPL: 12 (ref 9–20)
CALCIUM SERPL-MCNC: 9.4 MG/DL (ref 8.7–10.2)
CHLORIDE SERPL-SCNC: 102 MMOL/L (ref 96–106)
CHOLEST SERPL-MCNC: 145 MG/DL (ref 100–199)
CO2 SERPL-SCNC: 29 MMOL/L (ref 20–29)
CREAT SERPL-MCNC: 0.89 MG/DL (ref 0.76–1.27)
GLOBULIN SER CALC-MCNC: 2.7 G/DL (ref 1.5–4.5)
GLUCOSE SERPL-MCNC: 81 MG/DL (ref 65–99)
HCT VFR BLD AUTO: 42.7 % (ref 37.5–51)
HDLC SERPL-MCNC: 44 MG/DL
HGB BLD-MCNC: 14.2 G/DL (ref 13–17.7)
LDLC SERPL CALC-MCNC: 85 MG/DL (ref 0–99)
MAGNESIUM SERPL-MCNC: 2.2 MG/DL (ref 1.6–2.3)
POTASSIUM SERPL-SCNC: 4.6 MMOL/L (ref 3.5–5.2)
PROT SERPL-MCNC: 7.1 G/DL (ref 6–8.5)
SODIUM SERPL-SCNC: 143 MMOL/L (ref 134–144)
TRIGL SERPL-MCNC: 81 MG/DL (ref 0–149)
TSH SERPL DL<=0.005 MIU/L-ACNC: 2.13 UIU/ML (ref 0.45–4.5)
VLDLC SERPL CALC-MCNC: 16 MG/DL (ref 5–40)

## 2019-05-15 NOTE — PATIENT INSTRUCTIONS
Learning About High Blood Pressure What is high blood pressure? Blood pressure is a measure of how hard the blood pushes against the walls of your arteries. It's normal for blood pressure to go up and down throughout the day, but if it stays up, you have high blood pressure. Another name for high blood pressure is hypertension. Two numbers tell you your blood pressure. The first number is the systolic pressure. It shows how hard the blood pushes when your heart is pumping. The second number is the diastolic pressure. It shows how hard the blood pushes between heartbeats, when your heart is relaxed and filling with blood. Your doctor will give you a goal for your blood pressure. Your goal will be based on your health and your age. High blood pressure (hypertension) means that the top number stays high, or the bottom number stays high, or both. High blood pressure increases the risk of stroke, heart attack, and other problems. You and your doctor will talk about your risks of these problems based on your blood pressure. What happens when you have high blood pressure? · Blood flows through your arteries with too much force. Over time, this damages the walls of your arteries. But you can't feel it. High blood pressure usually doesn't cause symptoms. · Fat and calcium start to build up in your arteries. This buildup is called plaque. Plaque makes your arteries narrower and stiffer. Blood can't flow through them as easily. · This lack of good blood flow starts to damage some of the organs in your body. This can lead to problems such as coronary artery disease and heart attack, heart failure, stroke, kidney failure, and eye damage. How can you prevent high blood pressure? · Stay at a healthy weight. · Try to limit how much sodium you eat to less than 2,300 milligrams (mg) a day. If you limit your sodium to 1,500 mg a day, you can lower your blood pressure even more. ? Buy foods that are labeled \"unsalted,\" \"sodium-free,\" or \"low-sodium. \" Foods labeled \"reduced-sodium\" and \"light sodium\" may still have too much sodium. ? Flavor your food with garlic, lemon juice, onion, vinegar, herbs, and spices instead of salt. Do not use soy sauce, steak sauce, onion salt, garlic salt, mustard, or ketchup on your food. ? Use less salt (or none) when recipes call for it. You can often use half the salt a recipe calls for without losing flavor. · Be physically active. Get at least 30 minutes of exercise on most days of the week. Walking is a good choice. You also may want to do other activities, such as running, swimming, cycling, or playing tennis or team sports. · Limit alcohol to 2 drinks a day for men and 1 drink a day for women. · Eat plenty of fruits, vegetables, and low-fat dairy products. Eat less saturated and total fats. How is high blood pressure treated? · Your doctor will suggest making lifestyle changes. For example, your doctor may ask you to eat healthy foods, quit smoking, lose extra weight, and be more active. · If lifestyle changes don't help enough, your doctor may recommend that you take medicine. · When blood pressure is very high, medicines are needed to lower it. Follow-up care is a key part of your treatment and safety. Be sure to make and go to all appointments, and call your doctor if you are having problems. It's also a good idea to know your test results and keep a list of the medicines you take. Where can you learn more? Go to http://lorna-galilea.info/. Enter P501 in the search box to learn more about \"Learning About High Blood Pressure. \" Current as of: July 22, 2018 Content Version: 11.9 © 2566-4082 [employer]. Care instructions adapted under license by [employer] (which disclaims liability or warranty for this information).  If you have questions about a medical condition or this instruction, always ask your healthcare professional. [address] any warranty or liability for your use of this information. Learning About High Blood Pressure What is high blood pressure? Blood pressure is a measure of how hard the blood pushes against the walls of your arteries. It's normal for blood pressure to go up and down throughout the day, but if it stays up, you have high blood pressure. Another name for high blood pressure is hypertension. Two numbers tell you your blood pressure. The first number is the systolic pressure. It shows how hard the blood pushes when your heart is pumping. The second number is the diastolic pressure. It shows how hard the blood pushes between heartbeats, when your heart is relaxed and filling with blood. Your doctor will give you a goal for your blood pressure. Your goal will be based on your health and your age. High blood pressure (hypertension) means that the top number stays high, or the bottom number stays high, or both. High blood pressure increases the risk of stroke, heart attack, and other problems. You and your doctor will talk about your risks of these problems based on your blood pressure. What happens when you have high blood pressure? · Blood flows through your arteries with too much force. Over time, this damages the walls of your arteries. But you can't feel it. High blood pressure usually doesn't cause symptoms. · Fat and calcium start to build up in your arteries. This buildup is called plaque. Plaque makes your arteries narrower and stiffer. Blood can't flow through them as easily. · This lack of good blood flow starts to damage some of the organs in your body. This can lead to problems such as coronary artery disease and heart attack, heart failure, stroke, kidney failure, and eye damage. How can you prevent high blood pressure? · Stay at a healthy weight. · Try to limit how much sodium you eat to less than 2,300 milligrams (mg) a day. If you limit your sodium to 1,500 mg a day, you can lower your blood pressure even more. ? Buy foods that are labeled \"unsalted,\" \"sodium-free,\" or \"low-sodium. \" Foods labeled \"reduced-sodium\" and \"light sodium\" may still have too much sodium. ? Flavor your food with garlic, lemon juice, onion, vinegar, herbs, and spices instead of salt. Do not use soy sauce, steak sauce, onion salt, garlic salt, mustard, or ketchup on your food. ? Use less salt (or none) when recipes call for it. You can often use half the salt a recipe calls for without losing flavor. · Be physically active. Get at least 30 minutes of exercise on most days of the week. Walking is a good choice. You also may want to do other activities, such as running, swimming, cycling, or playing tennis or team sports. · Limit alcohol to 2 drinks a day for men and 1 drink a day for women. · Eat plenty of fruits, vegetables, and low-fat dairy products. Eat less saturated and total fats. How is high blood pressure treated? · Your doctor will suggest making lifestyle changes. For example, your doctor may ask you to eat healthy foods, quit smoking, lose extra weight, and be more active. · If lifestyle changes don't help enough, your doctor may recommend that you take medicine. · When blood pressure is very high, medicines are needed to lower it. Follow-up care is a key part of your treatment and safety. Be sure to make and go to all appointments, and call your doctor if you are having problems. It's also a good idea to know your test results and keep a list of the medicines you take. Where can you learn more? Go to http://lorna-galilea.info/. Enter P501 in the search box to learn more about \"Learning About High Blood Pressure. \" Current as of: July 22, 2018 Content Version: 11.9 © 0090-6104 [employer].  Care instructions adapted under license by [address] (which disclaims liability or warranty for this information). If you have questions about a medical condition or this instruction, always ask your healthcare professional. [address] any warranty or liability for your use of this information.

## 2019-05-15 NOTE — PROGRESS NOTES
Patient: Cara Neal MRN: [id number]  SSN: [id number] YOB: 1960  Age: 62 y.o. Sex: male Subjective: Chief Complaint Patient presents with  Hypertension HPI: he is a 62y.o. year old male who presents for follow up of HTN. His wife recently passed away from AMI and he is now worried about his heart. Says he is coping well with loss of spouse. Has returned to work. Complains of episodic chest pain which has added to his worry about heart disease. Hypertension: The patient reports that he is taking medications as instructed, no medication side effects noted, no dyspnea on exertion, no swelling of ankles. BP Readings from Last 3 Encounters:  
05/03/19 133/87  
11/05/18 143/89  
06/18/18 126/85 Lab Results Component Value Date/Time Sodium 143 05/03/2019 02:47 PM  
 Potassium 4.6 05/03/2019 02:47 PM  
 Chloride 102 05/03/2019 02:47 PM  
 CO2 29 05/03/2019 02:47 PM  
 Anion gap 9 09/28/2010 10:09 AM  
 Glucose 81 05/03/2019 02:47 PM  
 BUN 11 05/03/2019 02:47 PM  
 Creatinine 0.89 05/03/2019 02:47 PM  
 BUN/Creatinine ratio 12 05/03/2019 02:47 PM  
 GFR est  05/03/2019 02:47 PM  
 GFR est non-AA 94 05/03/2019 02:47 PM  
 Calcium 9.4 05/03/2019 02:47 PM  
 Bilirubin, total 1.0 05/03/2019 02:47 PM  
 ALT (SGPT) 16 05/03/2019 02:47 PM  
 AST (SGOT) 18 05/03/2019 02:47 PM  
 Alk. phosphatase 56 05/03/2019 02:47 PM  
 Protein, total 7.1 05/03/2019 02:47 PM  
 Albumin 4.4 05/03/2019 02:47 PM  
 Globulin 3.3 09/28/2010 10:09 AM  
 A-G Ratio 1.6 05/03/2019 02:47 PM  
  
 
 
Patient advised to log blood pressures at home 2-3 times monthly and bring to next visit. Call office as soon as possible if BP's over 140/90 on multiple occasions or with symptoms of dizziness, chest pain, shortness of breath, headache or ankle swelling.  
Our goal is to normalize the blood pressure to decrease the risks of strokes and heart attacks. The patient is in agreement with the plan. Encounter Diagnoses Name Primary?  Other chest pain Yes  Essential hypertension  Spouse  Current and past medical information: 
 
Current Medications after this visit[de-identified]    
Current Outpatient Medications Medication Sig  Blood Pressure Monitor kit Check blood pressure 2-3 times weekly. For Hypertension.  lisinopril (PRINIVIL, ZESTRIL) 30 mg tablet TAKE 1 TABLET BY MOUTH ONCE DAILY No current facility-administered medications for this visit. Patient Active Problem List  
 Diagnosis Date Noted  Vitamin D deficiency 2014  GERD (gastroesophageal reflux disease) 2012  
 HTN (hypertension) 2010 Past Medical History:  
Diagnosis Date  
 HTN (hypertension) 2010 Allergies Allergen Reactions  Codeine Nausea and Vomiting No past surgical history on file. Social History Socioeconomic History  Marital status:  Spouse name: Not on file  Number of children: Not on file  Years of education: Not on file  Highest education level: Not on file Tobacco Use  Smoking status: Never Smoker  Smokeless tobacco: Never Used Substance and Sexual Activity  Alcohol use: No  
 Drug use: No  
 
 
 
Objective:  
 
Review of Systems: 
Constitutional: Negative for fatigue or malaise Derm: Negative for rash or lesion HEENT: Negative for acute hearing or vision changes Cardiovascular: Negative for dizziness, chest pain or palpitations Respiratory: Negative for cough, wheezing or SOB Gastreintestinal: Negative for nausea or abdominal pain Genital/urinary: Negative for dysuria or voiding dysfunction Muscoloskeletal: Negative for acute myalgias or arthralgias Neurological: Negative for headache, weakness or paresthesia Psychological: Negative for depression or anxiety Vitals:  
 19 0957 19 1045 BP: (!) 153/94 133/87 Pulse: 68 Resp: 16 Temp: 97.8 °F (36.6 °C) TempSrc: Oral   
SpO2: 99% Weight: 173 lb (78.5 kg) Height: 5' 8\" (1.727 m) Body mass index is 26.3 kg/m². Physical Exam: 
Constitutional: well developed, well nourished, in no acute distress Skin: warm and dry, normal tone and turgor Head: normocephalic, atraumatic Eyes: sclera clear, EOMI, PERRL Neck: normal range of motion Cardiovascular: normal S1, S2, regular rate and rhythm Respiratory: clear to auscultation bilaterally with symmetrical effort Abdomen: soft, BS normal 
Extremities: full range of motion Neurology: normal strength and sensation Psych: active, alert and oriented, affect appropriate Assessment and orders: ICD-10-CM ICD-9-CM 1. Other chest pain R07.89 786.59 AMB POC EKG ROUTINE W/ 12 LEADS, INTER & REP  
   REFERRAL TO CARDIOLOGY 2. Essential hypertension I10 401.9 AMB POC EKG ROUTINE W/ 12 LEADS, INTER & REP Blood Pressure Monitor kit LIPID PANEL  
   MAGNESIUM  
   METABOLIC PANEL, COMPREHENSIVE  
   TSH 3RD GENERATION  
   HGB & HCT  
   REFERRAL TO CARDIOLOGY 3. Spouse  Z57.3 V63.80 Plan of care: 
Diagnoses were discussed in detail with patient. Medication risks/benefits/side effects discussed with patient. All of the patient's questions were addressed. The patient understands and agrees with our plan of care. The patient knows to call back if they are unsure of or forgets any changes we discussed today or if the symptoms change. The patient received an After-Visit Summary which contains VS, diagnoses, orders, allergy and medication lists. Patient Care Team: 
Agusto Bedolla MD as PCP - [facility]) Follow-up and Dispositions · Return in about 1 month (around 2019), or if symptoms worsen or fail to improve. No future appointments. Signed By: Eran Munoz MD   
 May 14, 2019

## 2019-06-26 ENCOUNTER — OFFICE VISIT (OUTPATIENT)
Dept: CARDIOLOGY CLINIC | Age: 59
End: 2019-06-26

## 2019-06-26 VITALS
WEIGHT: 178 LBS | TEMPERATURE: 97.9 F | HEIGHT: 68 IN | SYSTOLIC BLOOD PRESSURE: 131 MMHG | HEART RATE: 74 BPM | BODY MASS INDEX: 26.98 KG/M2 | DIASTOLIC BLOOD PRESSURE: 86 MMHG | RESPIRATION RATE: 16 BRPM

## 2019-06-26 DIAGNOSIS — R07.89 CHEST TIGHTNESS: Primary | ICD-10-CM

## 2019-06-26 DIAGNOSIS — I10 ESSENTIAL HYPERTENSION: ICD-10-CM

## 2019-06-26 DIAGNOSIS — F43.21 GRIEF: ICD-10-CM

## 2019-06-26 DIAGNOSIS — R94.31 ABNORMAL EKG: ICD-10-CM

## 2019-06-26 RX ORDER — ACETAMINOPHEN 500 MG
TABLET ORAL
Refills: 0 | COMMUNITY
Start: 2019-05-03

## 2019-06-26 NOTE — PROGRESS NOTES
1. Have you been to the ER, urgent care clinic since your last visit? Hospitalized since your last visit? No    2. Have you seen or consulted any other health care providers outside of the [address] [place] [address] since your last visit? Include any pap smears or colon screening.  No  Reviewed record in preparation for visit and have necessary documentation  Pt did not bring medication to office visit for review    Goals that were addressed and/or need to be completed during or after this appointment include   Health Maintenance Due   Topic Date Due    COLONOSCOPY  07/18/1978    DTaP/Tdap/Td series (1 - Tdap) 07/18/1981    Shingrix Vaccine Age 50> (1 of 2) 07/18/2010

## 2019-06-26 NOTE — PATIENT INSTRUCTIONS
Owen Coppola with [facility]  [address] [place] [address], [facility] [address] [facility], [address] [facility]  [phone]    Monitor blood pressure outside the office several times weekly at different times during the day and evening. Bring the record to me in 3 weeks for review. Blood Pressure Record     Patient Name:  ______________________ :  ______________________    Date/Time BP Reading Pulse                                                                                                                                                                                                Home Blood Pressure Test: About This Test  What is it? A home blood pressure test allows you to keep track of your blood pressure at home. Blood pressure is a measure of the force of blood against the walls of your arteries. Blood pressure readings include two numbers, such as 130/80 (say \"130 over 80\"). The first number is the systolic pressure. The second number is the diastolic pressure. Why is this test done? You may do this test at home to:  · Find out if you have high blood pressure. · Track your blood pressure if you have high blood pressure. · Track how well medicine is working to reduce high blood pressure. · Check how lifestyle changes, such as weight loss and exercise, are affecting blood pressure. How can you prepare for the test?  · Do not use caffeine, tobacco, or medicines known to raise blood pressure (such as nasal decongestant sprays) for at least 30 minutes before taking your blood pressure. · Do not exercise for at least 30 minutes before taking your blood pressure. What happens before the test?  Take your blood pressure while you feel comfortable and relaxed.  Sit quietly with both feet on the floor for at least 5 minutes before the test.  What happens during the test?  · Sit with your arm slightly bent and resting on a table so that your upper arm is at the same level as your heart.  · Roll up your sleeve or take off your shirt to expose your upper arm. · Wrap the blood pressure cuff around your upper arm so that the lower edge of the cuff is about 1 inch above the bend of your elbow. Proceed with the following steps depending on if you are using an automatic or manual pressure monitor. Automatic blood pressure monitors  · Press the on/off button on the automatic monitor and wait until the ready-to-measure \"heart\" symbol appears next to zero in the display window. · Press the start button. The cuff will inflate and deflate by itself. · Your blood pressure numbers will appear on the screen. · Write your numbers in your log book, along with the date and time. Manual blood pressure monitors  · Place the earpieces of a stethoscope in your ears, and place the bell of the stethoscope over the artery, just below the cuff. · Close the valve on the rubber inflating bulb. · Squeeze the bulb rapidly with your opposite hand to inflate the cuff until the dial or column of mercury reads about 30 mm Hg higher than your usual systolic pressure. If you do not know your usual pressure, inflate the cuff to 210 mm Hg or until the pulse at your wrist disappears. · Open the pressure valve just slightly by twisting or pressing the valve on the bulb. · As you watch the pressure slowly fall, note the level on the dial at which you first start to hear a pulsing or tapping sound through the stethoscope. This is your systolic blood pressure. · Continue letting the air out slowly. The sounds will become muffled and will finally disappear. Note the pressure when the sounds completely disappear. This is your diastolic blood pressure. Let out all the remaining air. · Write your numbers in your log book, along with the date and time. What else should you know about the test?  It is more accurate to take the average of several readings made throughout the day than to rely on a single reading.  It's normal for blood pressure to go up and down throughout the day. Follow-up care is a key part of your treatment and safety. Be sure to make and go to all appointments, and call your doctor if you are having problems. It's also a good idea to keep a list of the medicines you take. Where can you learn more? Go to http://lorna-galilea.info/. Enter C427 in the search box to learn more about \"Home Blood Pressure Test: About This Test.\"  Current as of: July 22, 2018  Content Version: 11.9  © 7570-9618 [employer]. Care instructions adapted under license by [employer] (which disclaims liability or warranty for this information). If you have questions about a medical condition or this instruction, always ask your healthcare professional. [address] any warranty or liability for your use of this information.

## 2019-08-06 ENCOUNTER — OFFICE VISIT (OUTPATIENT)
Dept: FAMILY MEDICINE CLINIC | Age: 59
End: 2019-08-06

## 2019-08-06 VITALS
DIASTOLIC BLOOD PRESSURE: 87 MMHG | HEIGHT: 68 IN | SYSTOLIC BLOOD PRESSURE: 137 MMHG | RESPIRATION RATE: 16 BRPM | BODY MASS INDEX: 26.28 KG/M2 | OXYGEN SATURATION: 97 % | TEMPERATURE: 98.5 F | HEART RATE: 70 BPM | WEIGHT: 173.4 LBS

## 2019-08-06 DIAGNOSIS — M25.561 ACUTE PAIN OF RIGHT KNEE: Primary | ICD-10-CM

## 2019-08-06 RX ORDER — IBUPROFEN 200 MG
TABLET ORAL
COMMUNITY

## 2019-08-06 NOTE — PROGRESS NOTES
[address], [address] [facility]  [phone]           Progress Note    Patient: Tracy Queen MRN: [id number]  SSN: [id number]    YOB: 1960  Age: 61 y.o. Sex: male        Chief Complaint   Patient presents with    Knee Swelling     Started Yesterday -- Right          Subjective:     Encounter Diagnoses   Name Primary?  Acute pain of right knee: He was kneeling down on his right knee yesterday for about 10 minutes. After that time he noticed pain and swelling just below his kneecap. The swelling is much better today with just taking ibuprofen. He has never had a knee injury and is never had anything like this before. No fever chills or sweats. Otherwise she feels well. He is due to get his nuclear stress test tomorrow. He was unable to go to work today. Yes             Current and past medical information:    Current Medications after this visit[de-identified]     Current Outpatient Medications   Medication Sig    ibuprofen (MOTRIN) 200 mg tablet Take  by mouth.  lisinopril (PRINIVIL, ZESTRIL) 30 mg tablet TAKE 1 TABLET BY MOUTH ONCE DAILY    Blood Pressure Test Kit-Wrist kit     Blood Pressure Monitor kit Check blood pressure 2-3 times weekly. For Hypertension. No current facility-administered medications for this visit. Patient Active Problem List    Diagnosis Date Noted    Vitamin D deficiency 11/20/2014    GERD (gastroesophageal reflux disease) 11/14/2012    HTN (hypertension) 05/24/2010       Past Medical History:   Diagnosis Date    HTN (hypertension) 5/24/2010       Allergies   Allergen Reactions    Codeine Nausea and Vomiting       History reviewed. No pertinent surgical history.     Social History     Socioeconomic History    Marital status:      Spouse name: Not on file    Number of children: Not on file    Years of education: Not on file    Highest education level: Not on file   Tobacco Use    Smoking status: Never Smoker    Smokeless tobacco: Never Used   Substance and Sexual Activity    Alcohol use: No    Drug use: No       Review of Systems   Constitutional: Negative. HENT: Negative. Eyes: Negative. Respiratory: Negative. Cardiovascular: Negative. Gastrointestinal: Negative. Genitourinary: Negative. Musculoskeletal: Positive for joint pain. Negative for falls. Skin: Negative. Endo/Heme/Allergies: Negative. Psychiatric/Behavioral: Negative. Objective:     Vitals:    08/06/19 0841   BP: 137/87   Pulse: 70   Resp: 16   Temp: 98.5 °F (36.9 °C)   TempSrc: Oral   SpO2: 97%   Weight: 173 lb 6.4 oz (78.7 kg)   Height: 5' 8\" (1.727 m)      Body mass index is 26.37 kg/m². Physical Exam   Constitutional: He is oriented to person, place, and time and well-developed, well-nourished, and in no distress. HENT:   Head: Normocephalic and atraumatic. Mouth/Throat: Oropharynx is clear and moist.   Eyes: Right eye exhibits no discharge. Left eye exhibits no discharge. No scleral icterus. Neck: No thyromegaly present. No bruit. Cardiovascular: Normal rate, regular rhythm and normal heart sounds. Pulmonary/Chest: Effort normal and breath sounds normal.   Abdominal: Soft. Musculoskeletal: He exhibits tenderness. Legs:  He has tenderness and mild swelling in the infrapatellar area. His range of motion is normal now. Yesterday he said he could not bend it. His knee is stable to my exam.   Neurological: He is alert and oriented to person, place, and time. Skin: No rash noted. No erythema. Psychiatric: Mood and affect normal.   Nursing note and vitals reviewed. Health Maintenance Due   Topic Date Due    COLONOSCOPY  07/18/1978    DTaP/Tdap/Td series (1 - Tdap) 07/18/1981    Shingrix Vaccine Age 50> (1 of 2) 07/18/2010    Influenza Age 5 to Adult  08/01/2019         Assessment and orders:     Encounter Diagnoses     ICD-10-CM ICD-9-CM   1. Acute pain of right knee M25.561 719.46     Diagnoses and all orders for this visit:    1. Acute pain of right knee-need to rule out intra-articular arthritis particularly on the kneecap that would predispose him to get this pain and swelling. Because he is due for stress test tomorrow and is concerned about his blood pressure going up I would recommend he just take Tylenol and use ice alternating with heat today. He will also keep his knee elevated. He will return to work on Friday. -     XR KNEE RT 3 V; Future      Plan of care:  Discussed diagnoses in detail with patient. Medication risks/benefits/side effects discussed with patient. All of the patient's questions were addressed. The patient understands and agrees with our plan of care. The patient knows to call back if they are unsure of or forget any changes we discussed today or if the symptoms change. The patient received an After-Visit Summary which contains VS, orders, medication list and allergy list. This can be used as a \"mini-medical record\" should they have to seek medical care while out of town. Patient Care Team:  Aranza Kee MD as PCP - [facility])  Taisha Davidson MD (Cardiology)    Follow-up and Dispositions    · Return if symptoms worsen or fail to improve. Future Appointments   Date Time Provider Hany Rowan   8/6/2019  9:10 AM [facility] RAD XR RM 1 [facility] AMB RAD [place]   8/7/2019  4:00 PM MARGARITO CLARKE YOAN SCHED   8/7/2019  4:00 PM YESY MOSLEY CAVSF YOAN SCHED       Signed By: Sp Simeon MD     August 6, 2019      ATTENTION:   This medical record was transcribed using an electronic medical records/speech recognition system. Although proofread, it may and can contain electronic, spelling and other errors. Corrections may be executed at a later time. Please feel free to contact me for any clarifications as needed.

## 2019-08-06 NOTE — PROGRESS NOTES
Chief Complaint   Patient presents with    Knee Swelling     Started Yesterday -- Right      Body mass index is 26.37 kg/m². 1. Have you been to the ER, urgent care clinic since your last visit? Hospitalized since your last visit? No    2. Have you seen or consulted any other health care providers outside of the [address] [place] [address] since your last visit? Include any pap smears or colon screening.  No    Reviewed record in preparation for visit and have necessary documentation  Pt did not bring medication to office visit for review  Information was given to pt on Advanced Directives, Living Will  Information was given on Shingles Vaccine  Opportunity was given for questions  Goals that were addressed and/or need to be completed after this appointment include:     Health Maintenance Due   Topic Date Due    COLONOSCOPY  07/18/1978    DTaP/Tdap/Td series (1 - Tdap) 07/18/1981    Shingrix Vaccine Age 50> (1 of 2) 07/18/2010    Influenza Age 9 to Adult  08/01/2019

## 2019-08-06 NOTE — LETTER
NOTIFICATION RETURN TO WORK 
 
8/6/2019 8:58 AM 
 
Mr. Wing Childs [address] [address] To Whom It May Concern: 
 
Wing Childs is currently under the care of [facility]. He will return to work on: 8/9/19 If there are questions or concerns please have the patient contact our office.  
 
 
 
Sincerely, 
 
 
Daisy Fletcher MD

## 2019-08-07 ENCOUNTER — TELEPHONE (OUTPATIENT)
Dept: FAMILY MEDICINE CLINIC | Age: 59
End: 2019-08-07

## 2019-08-07 NOTE — TELEPHONE ENCOUNTER
Patient unavailable to come to phone. When he calls back please inform him of the following per Dr. Jorje Lechuga: \"His xray showed soft tissue swelling near his kneecap but no arthritis seen. Continue our plan. \"

## 2019-08-13 ENCOUNTER — TELEPHONE (OUTPATIENT)
Dept: CARDIOLOGY CLINIC | Age: 59
End: 2019-08-13

## 2019-08-13 NOTE — TELEPHONE ENCOUNTER
Patient inquiring about stress test results. He asks that you call after 3PM tomorrow.        Phone: [phone]

## 2019-08-14 NOTE — TELEPHONE ENCOUNTER
Verified patient with two types of identifiers. Let patient know I will call him with results once Dr. David Thomas has read them. Patient verbalized understanding and will call with any other questions.

## 2019-08-17 NOTE — TELEPHONE ENCOUNTER
See result note   Copy over:  Exercise stress echo is normal.   Let pt know  No future appointments.    See me in 6  months at [facility]

## 2019-08-19 NOTE — TELEPHONE ENCOUNTER
Verified patient with two types of identifiers. Per patient left detailed message with with results and to call back to schedule 6 month follow up in [facility].

## 2019-10-16 ENCOUNTER — OFFICE VISIT (OUTPATIENT)
Dept: FAMILY MEDICINE CLINIC | Age: 59
End: 2019-10-16

## 2019-10-16 VITALS
HEIGHT: 68 IN | WEIGHT: 176 LBS | RESPIRATION RATE: 16 BRPM | SYSTOLIC BLOOD PRESSURE: 125 MMHG | HEART RATE: 61 BPM | BODY MASS INDEX: 26.67 KG/M2 | DIASTOLIC BLOOD PRESSURE: 84 MMHG | OXYGEN SATURATION: 99 % | TEMPERATURE: 97.9 F

## 2019-10-16 DIAGNOSIS — F43.21 FEELING GRIEF: ICD-10-CM

## 2019-10-16 DIAGNOSIS — Z00.00 ANNUAL PHYSICAL EXAM: Primary | ICD-10-CM

## 2019-10-16 DIAGNOSIS — Z12.11 SCREEN FOR COLON CANCER: ICD-10-CM

## 2019-10-16 DIAGNOSIS — I10 ESSENTIAL HYPERTENSION: ICD-10-CM

## 2019-10-16 DIAGNOSIS — Z63.4 SPOUSE DECEASED: ICD-10-CM

## 2019-10-16 RX ORDER — LISINOPRIL 30 MG/1
30 TABLET ORAL DAILY
Qty: 90 TAB | Refills: 1 | Status: SHIPPED | OUTPATIENT
Start: 2019-10-16 | End: 2020-05-08

## 2019-10-16 SDOH — SOCIAL STABILITY - SOCIAL INSECURITY: DISSAPEARANCE AND DEATH OF FAMILY MEMBER: Z63.4

## 2019-10-16 NOTE — PROGRESS NOTES
1. Have you been to the ER, urgent care clinic since your last visit? Hospitalized since your last visit? No    2. Have you seen or consulted any other health care providers outside of the [address] [place] [address] since your last visit? Include any pap smears or colon screening.  No  Reviewed record in preparation for visit and have necessary documentation  Pt did not bring medication to office visit for review    Goals that were addressed and/or need to be completed during or after this appointment include   Health Maintenance Due   Topic Date Due    COLONOSCOPY  07/18/1978    DTaP/Tdap/Td series (1 - Tdap) 07/18/1981    Shingrix Vaccine Age 50> (1 of 2) 07/18/2010    Influenza Age 9 to Adult  08/01/2019

## 2019-10-16 NOTE — PATIENT INSTRUCTIONS
[address] [facility] with [address] [place] [address], [facility] [address] Mauro Gutierrez [facility] 
[phone] Monitor blood pressure outside the office several times weekly at different times during the day and evening. Bring the record to me in 3 weeks for review. Blood Pressure Record Patient Name:  ______________________ :  ______________________ Date/Time BP Reading Pulse Home Blood Pressure Test: About This Test 
What is it? A home blood pressure test allows you to keep track of your blood pressure at home. Blood pressure is a measure of the force of blood against the walls of your arteries. Blood pressure readings include two numbers, such as 130/80 (say \"130 over 80\"). The first number is the systolic pressure. The second number is the diastolic pressure. Why is this test done? You may do this test at home to: · Find out if you have high blood pressure. · Track your blood pressure if you have high blood pressure. · Track how well medicine is working to reduce high blood pressure. · Check how lifestyle changes, such as weight loss and exercise, are affecting blood pressure. How can you prepare for the test? 
· Do not use caffeine, tobacco, or medicines known to raise blood pressure (such as nasal decongestant sprays) for at least 30 minutes before taking your blood pressure. · Do not exercise for at least 30 minutes before taking your blood pressure. What happens before the test? 
Take your blood pressure while you feel comfortable and relaxed.  Sit quietly with both feet on the floor for at least 5 minutes before the test. 
What happens during the test? 
· Sit with your arm slightly bent and resting on a table so that your upper arm is at the same level as your heart. · Roll up your sleeve or take off your shirt to expose your upper arm. · Wrap the blood pressure cuff around your upper arm so that the lower edge of the cuff is about 1 inch above the bend of your elbow. Proceed with the following steps depending on if you are using an automatic or manual pressure monitor. Automatic blood pressure monitors · Press the on/off button on the automatic monitor and wait until the ready-to-measure \"heart\" symbol appears next to zero in the display window. · Press the start button. The cuff will inflate and deflate by itself. · Your blood pressure numbers will appear on the screen. · Write your numbers in your log book, along with the date and time. Manual blood pressure monitors · Place the earpieces of a stethoscope in your ears, and place the bell of the stethoscope over the artery, just below the cuff. · Close the valve on the rubber inflating bulb. · Squeeze the bulb rapidly with your opposite hand to inflate the cuff until the dial or column of mercury reads about 30 mm Hg higher than your usual systolic pressure. If you do not know your usual pressure, inflate the cuff to 210 mm Hg or until the pulse at your wrist disappears. · Open the pressure valve just slightly by twisting or pressing the valve on the bulb. · As you watch the pressure slowly fall, note the level on the dial at which you first start to hear a pulsing or tapping sound through the stethoscope. This is your systolic blood pressure. · Continue letting the air out slowly. The sounds will become muffled and will finally disappear. Note the pressure when the sounds completely disappear. This is your diastolic blood pressure. Let out all the remaining air. · Write your numbers in your log book, along with the date and time.  
What else should you know about the test? 
It is more accurate to take the average of several readings made throughout the day than to rely on a single reading. It's normal for blood pressure to go up and down throughout the day. Follow-up care is a key part of your treatment and safety. Be sure to make and go to all appointments, and call your doctor if you are having problems. It's also a good idea to keep a list of the medicines you take. Where can you learn more? Go to http://lorna-galilea.info/. Enter C427 in the search box to learn more about \"Home Blood Pressure Test: About This Test.\" Current as of: April 9, 2019 Content Version: 12.2 © 6564-8261 [employer]. Care instructions adapted under license by [employer] (which disclaims liability or warranty for this information). If you have questions about a medical condition or this instruction, always ask your healthcare professional. [address] any warranty or liability for your use of this information.

## 2019-10-17 LAB
ALBUMIN SERPL-MCNC: 4.4 G/DL (ref 3.5–5.5)
ALBUMIN/GLOB SERPL: 1.5 {RATIO} (ref 1.2–2.2)
ALP SERPL-CCNC: 53 IU/L (ref 39–117)
ALT SERPL-CCNC: 17 IU/L (ref 0–44)
AST SERPL-CCNC: 18 IU/L (ref 0–40)
BILIRUB SERPL-MCNC: 0.9 MG/DL (ref 0–1.2)
BUN SERPL-MCNC: 11 MG/DL (ref 6–24)
BUN/CREAT SERPL: 12 (ref 9–20)
CALCIUM SERPL-MCNC: 9.6 MG/DL (ref 8.7–10.2)
CHLORIDE SERPL-SCNC: 101 MMOL/L (ref 96–106)
CHOLEST SERPL-MCNC: 155 MG/DL (ref 100–199)
CO2 SERPL-SCNC: 27 MMOL/L (ref 20–29)
CREAT SERPL-MCNC: 0.89 MG/DL (ref 0.76–1.27)
GLOBULIN SER CALC-MCNC: 2.9 G/DL (ref 1.5–4.5)
GLUCOSE SERPL-MCNC: 80 MG/DL (ref 65–99)
HCT VFR BLD AUTO: 43.7 % (ref 37.5–51)
HDLC SERPL-MCNC: 44 MG/DL
HGB BLD-MCNC: 15 G/DL (ref 13–17.7)
LDLC SERPL CALC-MCNC: 95 MG/DL (ref 0–99)
POTASSIUM SERPL-SCNC: 4.6 MMOL/L (ref 3.5–5.2)
PROT SERPL-MCNC: 7.3 G/DL (ref 6–8.5)
SODIUM SERPL-SCNC: 143 MMOL/L (ref 134–144)
TRIGL SERPL-MCNC: 80 MG/DL (ref 0–149)
TSH SERPL DL<=0.005 MIU/L-ACNC: 2.1 UIU/ML (ref 0.45–4.5)
VLDLC SERPL CALC-MCNC: 16 MG/DL (ref 5–40)

## 2019-10-21 NOTE — PROGRESS NOTES
Patient: Violet Granger MRN: [id number]  SSN: [id number]    YOB: 1960  Age: 61 y.o. Sex: male        Subjective:     No chief complaint on file. HPI: he is a 61y.o. year old male who presents for follow up of HTN. His wife recently passed away from AMI. Says he is managing to cope with loss of spouse. Holiday season will be difficult. Hypertension:  The patient reports that he is taking medications as instructed, no medication side effects noted, no dyspnea on exertion, no swelling of ankles. BP Readings from Last 3 Encounters:   10/16/19 125/84   19 137/87   19 131/86     Lab Results   Component Value Date/Time    Sodium 143 10/16/2019 10:26 AM    Potassium 4.6 10/16/2019 10:26 AM    Chloride 101 10/16/2019 10:26 AM    CO2 27 10/16/2019 10:26 AM    Anion gap 9 2010 10:09 AM    Glucose 80 10/16/2019 10:26 AM    BUN 11 10/16/2019 10:26 AM    Creatinine 0.89 10/16/2019 10:26 AM    BUN/Creatinine ratio 12 10/16/2019 10:26 AM    GFR est  10/16/2019 10:26 AM    GFR est non-AA 94 10/16/2019 10:26 AM    Calcium 9.6 10/16/2019 10:26 AM    Bilirubin, total 0.9 10/16/2019 10:26 AM    ALT (SGPT) 17 10/16/2019 10:26 AM    AST (SGOT) 18 10/16/2019 10:26 AM    Alk. phosphatase 53 10/16/2019 10:26 AM    Protein, total 7.3 10/16/2019 10:26 AM    Albumin 4.4 10/16/2019 10:26 AM    Globulin 3.3 2010 10:09 AM    A-G Ratio 1.5 10/16/2019 10:26 AM          Patient advised to log blood pressures at home 2-3 times monthly and bring to next visit. Call office as soon as possible if BP's over 140/90 on multiple occasions or with symptoms of dizziness, chest pain, shortness of breath, headache or ankle swelling. Our goal is to normalize the blood pressure to decrease the risks of strokes and heart attacks. The patient is in agreement with the plan. Encounter Diagnoses   Name Primary?     Essential hypertension Yes    Feeling grief     Spouse      Screen for colon cancer        Current and past medical information:    Current Medications after this visit[de-identified]     Current Outpatient Medications   Medication Sig    lisinopril (PRINIVIL, ZESTRIL) 30 mg tablet Take 1 Tab by mouth daily.  ibuprofen (MOTRIN) 200 mg tablet Take  by mouth.  Blood Pressure Test Kit-Wrist kit     Blood Pressure Monitor kit Check blood pressure 2-3 times weekly. For Hypertension. No current facility-administered medications for this visit. Patient Active Problem List    Diagnosis Date Noted    Vitamin D deficiency 11/20/2014    GERD (gastroesophageal reflux disease) 11/14/2012    HTN (hypertension) 05/24/2010       Past Medical History:   Diagnosis Date    HTN (hypertension) 5/24/2010       Allergies   Allergen Reactions    Codeine Nausea and Vomiting       History reviewed. No pertinent surgical history.     Social History     Socioeconomic History    Marital status:      Spouse name: Not on file    Number of children: Not on file    Years of education: Not on file    Highest education level: Not on file   Tobacco Use    Smoking status: Never Smoker    Smokeless tobacco: Never Used   Substance and Sexual Activity    Alcohol use: No    Drug use: No         Objective:     Review of Systems:  Constitutional: Negative for fatigue or malaise  Derm: Negative for rash or lesion  HEENT: Negative for acute hearing or vision changes  Cardiovascular: Negative for dizziness, chest pain or palpitations  Respiratory: Negative for cough, wheezing or SOB  Gastreintestinal: Negative for nausea or abdominal pain  Genital/urinary: Negative for dysuria or voiding dysfunction  Muscoloskeletal: Negative for acute myalgias or arthralgias   Neurological: Negative for headache, weakness or paresthesia  Psychological: see HPI     Vitals:    10/16/19 0934   BP: 125/84   Pulse: 61   Resp: 16   Temp: 97.9 °F (36.6 °C)   TempSrc: Oral   SpO2: 99%   Weight: 176 lb (79.8 kg)   Height: 5' 8\" (1.727 m)      Body mass index is 26.76 kg/m². Physical Exam:  Constitutional: well developed, well nourished, in no acute distress  Skin: warm and dry, normal tone and turgor  Head: normocephalic, atraumatic  Eyes: sclera clear, EOMI, PERRL  Neck: normal range of motion  Cardiovascular: normal S1, S2, regular rate and rhythm  Respiratory: clear to auscultation bilaterally with symmetrical effort  Abdomen: soft, BS normal  Extremities: full range of motion, normal gait  Neurology: normal strength and sensation  Psych: active, alert and oriented, affect appropriate       Assessment and orders:       ICD-10-CM ICD-9-CM    1. Essential hypertension I10 401.9 lisinopril (PRINIVIL, ZESTRIL) 30 mg tablet      LIPID PANEL      METABOLIC PANEL, COMPREHENSIVE      TSH 3RD GENERATION      HGB & HCT   2. Feeling grief F43.21 309.0    3. Spouse  Z63.4 V62.82    4. Screen for colon cancer Z12.11 V76.51 CANCELED: AMB POC FECAL OCCULT BLOOD (INSURE FIT)         Plan of care:  Diagnoses were discussed in detail with patient. Medication risks/benefits/side effects discussed with patient. All of the patient's questions were addressed. The patient understands and agrees with our plan of care. The patient knows to call back if they are unsure of or forgets any changes we discussed today or if the symptoms change. The patient received an After-Visit Summary which contains VS, diagnoses, orders, allergy and medication lists. Patient Care Team:  Alyx Wilson MD as PCP - [facility])  Griselda Mu, MD (Cardiology)    Follow-up and Dispositions    · Return in about 6 months (around 2020), or if symptoms worsen or fail to improve. No future appointments.     Signed By: Devora Nguyen MD     2019

## 2019-12-18 ENCOUNTER — OFFICE VISIT (OUTPATIENT)
Dept: FAMILY MEDICINE CLINIC | Age: 59
End: 2019-12-18

## 2019-12-18 ENCOUNTER — HOSPITAL ENCOUNTER (OUTPATIENT)
Dept: LAB | Age: 59
Discharge: HOME | End: 2019-12-18

## 2019-12-18 VITALS
WEIGHT: 175 LBS | SYSTOLIC BLOOD PRESSURE: 123 MMHG | HEIGHT: 68 IN | RESPIRATION RATE: 18 BRPM | OXYGEN SATURATION: 99 % | BODY MASS INDEX: 26.52 KG/M2 | HEART RATE: 84 BPM | TEMPERATURE: 98.1 F | DIASTOLIC BLOOD PRESSURE: 86 MMHG

## 2019-12-18 DIAGNOSIS — R42 LIGHT-HEADEDNESS: Primary | ICD-10-CM

## 2019-12-18 DIAGNOSIS — R53.83 FATIGUE, UNSPECIFIED TYPE: ICD-10-CM

## 2019-12-18 LAB
ALBUMIN SERPL-MCNC: 4.1 G/DL (ref 3.5–5)
ALBUMIN/GLOB SERPL: 1.2 {RATIO} (ref 1.1–2.2)
ALP SERPL-CCNC: 58 U/L (ref 45–117)
ALT SERPL-CCNC: 26 U/L (ref 12–78)
ANION GAP SERPL CALC-SCNC: 1 MMOL/L (ref 5–15)
AST SERPL-CCNC: 14 U/L (ref 15–37)
BILIRUB SERPL-MCNC: 0.5 MG/DL (ref 0.2–1)
BUN SERPL-MCNC: 16 MG/DL (ref 6–20)
BUN/CREAT SERPL: 17 (ref 12–20)
CALCIUM SERPL-MCNC: 9.3 MG/DL (ref 8.5–10.1)
CHLORIDE SERPL-SCNC: 107 MMOL/L (ref 97–108)
CO2 SERPL-SCNC: 33 MMOL/L (ref 21–32)
CREAT SERPL-MCNC: 0.94 MG/DL (ref 0.7–1.3)
ERYTHROCYTE [DISTWIDTH] IN BLOOD BY AUTOMATED COUNT: 12.5 % (ref 11.5–14.5)
GLOBULIN SER CALC-MCNC: 3.3 G/DL (ref 2–4)
GLUCOSE SERPL-MCNC: 86 MG/DL (ref 65–100)
HCT VFR BLD AUTO: 44.3 % (ref 36.6–50.3)
HGB BLD-MCNC: 14.3 G/DL (ref 12.1–17)
MCH RBC QN AUTO: 31.2 PG (ref 26–34)
MCHC RBC AUTO-ENTMCNC: 32.3 G/DL (ref 30–36.5)
MCV RBC AUTO: 96.5 FL (ref 80–99)
NRBC # BLD: 0 K/UL (ref 0–0.01)
NRBC BLD-RTO: 0 PER 100 WBC
PLATELET # BLD AUTO: 217 K/UL (ref 150–400)
PMV BLD AUTO: 12 FL (ref 8.9–12.9)
POTASSIUM SERPL-SCNC: 4.6 MMOL/L (ref 3.5–5.1)
PROT SERPL-MCNC: 7.4 G/DL (ref 6.4–8.2)
RBC # BLD AUTO: 4.59 M/UL (ref 4.1–5.7)
SODIUM SERPL-SCNC: 141 MMOL/L (ref 136–145)
T4 FREE SERPL-MCNC: 0.9 NG/DL (ref 0.8–1.5)
TSH SERPL DL<=0.05 MIU/L-ACNC: 1.84 UIU/ML (ref 0.36–3.74)
WBC # BLD AUTO: 6.3 K/UL (ref 4.1–11.1)

## 2019-12-18 NOTE — PROGRESS NOTES
[facility]    History of Present Illness:   Arnoldo Olmstead is a 61 y.o. male with history of HTN, GERD, Vitamin D deficiency  CC: Light headedness  History provided by patient and Records    HPI:  Patient reports 2 days of \"light headedness\". Light headedness occurs with walking and going from sitting to standing, states \"I feel out of whack\". Patient denies drug use, alcohol use, tobacco use. Patient is a  at an air bag plant, but away from chemicals. Normally getting 7 hours of sleep. Denies previous episodes of similar symptoms or of vertigo. Denies other history, recent sick contacts or anxiety/depression. Does report diet has been poor since dentures, and wife  in March. Patient exercises nightly at this time. Health Maintenance  Health Maintenance Due   Topic Date Due    COLONOSCOPY  1978       Past Medical, Family, and Social History:     Current Outpatient Medications on File Prior to Visit   Medication Sig Dispense Refill    lisinopril (PRINIVIL, ZESTRIL) 30 mg tablet Take 1 Tab by mouth daily. 90 Tab 1    ibuprofen (MOTRIN) 200 mg tablet Take  by mouth.  Blood Pressure Test Kit-Wrist kit   0    Blood Pressure Monitor kit Check blood pressure 2-3 times weekly. For Hypertension. 1 Kit 0     No current facility-administered medications on file prior to visit.         Patient Active Problem List   Diagnosis Code    HTN (hypertension) I10    GERD (gastroesophageal reflux disease) K21.9    Vitamin D deficiency E55.9       Social History     Socioeconomic History    Marital status:      Spouse name: Not on file    Number of children: Not on file    Years of education: Not on file    Highest education level: Not on file   Occupational History    Not on file   Social Needs    Financial resource strain: Not on file    Food insecurity:     Worry: Not on file     Inability: Not on file    Transportation needs:     Medical: Not on file Non-medical: Not on file   Tobacco Use    Smoking status: Never Smoker    Smokeless tobacco: Never Used   Substance and Sexual Activity    Alcohol use: No    Drug use: No    Sexual activity: Not on file   Lifestyle    Physical activity:     Days per week: Not on file     Minutes per session: Not on file    Stress: Not on file   Relationships    Social connections:     Talks on phone: Not on file     Gets together: Not on file     Attends [employer] service: Not on file     Active member of club or organization: Not on file     Attends meetings of clubs or organizations: Not on file     Relationship status: Not on file    Intimate partner violence:     Fear of current or ex partner: Not on file     Emotionally abused: Not on file     Physically abused: Not on file     Forced sexual activity: Not on file   Other Topics Concern    Not on file   Social History Narrative    Not on file       Review of Systems   Review of Systems   Constitutional: Positive for malaise/fatigue. Negative for chills and fever. HENT: Negative for congestion. Eyes: Negative for pain. Respiratory: Negative for cough. Cardiovascular: Negative for chest pain and palpitations. Gastrointestinal: Negative for abdominal pain, nausea and vomiting. Genitourinary: Negative for dysuria, frequency and urgency. Musculoskeletal: Negative for myalgias. Skin: Negative for rash. Neurological: Negative for focal weakness, loss of consciousness and weakness. Light heaed   Endo/Heme/Allergies: Negative for polydipsia. Psychiatric/Behavioral: Negative for depression and substance abuse. Objective:     Visit Vitals  /86 (BP Patient Position: Standing) Comment: standing   Pulse 84   Temp 98.1 °F (36.7 °C) (Oral)   Resp 18   Ht 5' 8\" (1.727 m)   Wt 175 lb (79.4 kg)   SpO2 99%   BMI 26.61 kg/m²        Physical Exam  Vitals signs and nursing note reviewed. Constitutional:       Appearance: Normal appearance. HENT:      Head: Normocephalic and atraumatic. Right Ear: Tympanic membrane normal.      Left Ear: Tympanic membrane normal.      Nose: Nose normal.      Mouth/Throat:      Mouth: Mucous membranes are moist.      Pharynx: Oropharynx is clear. Eyes:      Pupils: Pupils are equal, round, and reactive to light. Funduscopic exam:     Right eye: No hemorrhage or papilledema. Left eye: No hemorrhage or papilledema. Neck:      Musculoskeletal: Normal range of motion and neck supple. Cardiovascular:      Rate and Rhythm: Normal rate and regular rhythm. Pulses: Normal pulses. Heart sounds: No murmur. No friction rub. No gallop. Pulmonary:      Effort: Pulmonary effort is normal.      Breath sounds: Normal breath sounds. Abdominal:      General: Abdomen is flat. Bowel sounds are normal.      Palpations: Abdomen is soft. Musculoskeletal: Normal range of motion. Lymphadenopathy:      Cervical: No cervical adenopathy. Skin:     General: Skin is warm and dry. Neurological:      General: No focal deficit present. Mental Status: He is alert and oriented to person, place, and time. Psychiatric:         Mood and Affect: Mood normal.         Behavior: Behavior normal.         Thought Content: Thought content normal.         Judgment: Judgment normal.         Pertinent Labs/Studies:      Assessment and orders:       ICD-10-CM ICD-9-CM    1. Light-headedness R42 780.4    2. Fatigue, unspecified type R53.83 780.79 CBC W/O DIFF      METABOLIC PANEL, COMPREHENSIVE      TSH 3RD GENERATION      T4, FREE      VITAMIN D, 25 HYDROXY     Diagnoses and all orders for this visit:    1. Light-headedness/Fatigue, unspecified type: unknown cause, multiple possible etiologies. Lab work up now. Depending on results will broaden work up as needed. -     CBC W/O DIFF; Future  -     METABOLIC PANEL, COMPREHENSIVE; Future  -     TSH 3RD GENERATION; Future  -     T4, FREE;  Future  -     VITAMIN D, 25 HYDROXY; Future      Follow-up and Dispositions    · Return in about 1 week (around 12/25/2019). I have discussed the diagnosis with the patient and the intended plan as seen in the above orders. Social history, medical history, and labs were reviewed. The patient has received an after-visit summary and questions were answered concerning future plans. I have discussed medication side effects and warnings with the patient as well.     MD GREGORIO Pate [facility]  12/18/19

## 2019-12-18 NOTE — PATIENT INSTRUCTIONS
Lightheadedness or Faintness: Care Instructions Your Care Instructions Lightheadedness is a feeling that you are about to faint or \"pass out. \" You do not feel as if you or your surroundings are moving. It is different from vertigo, which is the feeling that you or things around you are spinning or tilting. Lightheadedness usually goes away or gets better when you lie down. If lightheadedness gets worse, it can lead to a fainting spell. It is common to feel lightheaded from time to time. Lightheadedness usually is not caused by a serious problem. It often is caused by a short-lasting drop in blood pressure and blood flow to your head that occurs when you get up too quickly from a seated or lying position. Follow-up care is a key part of your treatment and safety. Be sure to make and go to all appointments, and call your doctor if you are having problems. It's also a good idea to know your test results and keep a list of the medicines you take. How can you care for yourself at home? · Lie down for 1 or 2 minutes when you feel lightheaded. After lying down, sit up slowly and remain sitting for 1 to 2 minutes before slowly standing up. · Avoid movements, positions, or activities that have made you lightheaded in the past. 
· Get plenty of rest, especially if you have a cold or flu, which can cause lightheadedness. · Make sure you drink plenty of fluids, especially if you have a fever or have been sweating. · Do not drive or put yourself and others in danger while you feel lightheaded. When should you call for help? Call 911 anytime you think you may need emergency care. For example, call if: 
  · You have symptoms of a stroke. These may include: 
? Sudden numbness, tingling, weakness, or loss of movement in your face, arm, or leg, especially on only one side of your body. ? Sudden vision changes. ? Sudden trouble speaking. ? Sudden confusion or trouble understanding simple statements. ? Sudden problems with walking or balance. ? A sudden, severe headache that is different from past headaches.  
  · You have symptoms of a heart attack. These may include: 
? Chest pain or pressure, or a strange feeling in the chest. 
? Sweating. ? Shortness of breath. ? Nausea or vomiting. ? Pain, pressure, or a strange feeling in the back, neck, jaw, or upper belly or in one or both shoulders or arms. ? Lightheadedness or sudden weakness. ? A fast or irregular heartbeat. After you call 911, the  may tell you to chew 1 adult-strength or 2 to 4 low-dose aspirin. Wait for an ambulance. Do not try to drive yourself.  
 Watch closely for changes in your health, and be sure to contact your doctor if: 
  · Your lightheadedness gets worse or does not get better with home care. Where can you learn more? Go to http://lorna-galilea.info/. Enter Q418 in the search box to learn more about \"Lightheadedness or Faintness: Care Instructions. \" Current as of: June 26, 2019 Content Version: 12.2 © 9522-0093 [employer]. Care instructions adapted under license by [employer] (which disclaims liability or warranty for this information). If you have questions about a medical condition or this instruction, always ask your healthcare professional. [address] any warranty or liability for your use of this information.

## 2019-12-18 NOTE — PROGRESS NOTES
1. Have you been to the ER, urgent care clinic, or been hospitalized since your last visit? No     2. Have you seen or consulted any other health care providers outside of the [address] [place] [address] since your last visit? No      Reviewed record in preparation for visit and have necessary documentation  Goals that were addressed and/or need to be completed during or after this appointment include   Health Maintenance Due   Topic Date Due    COLONOSCOPY  07/18/1978       I have received verbal consent from Ernesto Martin to discuss any/all medical information while others present in the room.

## 2019-12-19 ENCOUNTER — TELEPHONE (OUTPATIENT)
Dept: FAMILY MEDICINE CLINIC | Age: 59
End: 2019-12-19

## 2019-12-19 LAB — 25(OH)D3 SERPL-MCNC: 43.5 NG/ML (ref 30–100)

## 2019-12-19 NOTE — TELEPHONE ENCOUNTER
Talked to patient about labs. Patient reports he is feeling well today.     MD GREGORIO Campos [facility]  12/19/19

## 2020-08-04 DIAGNOSIS — I10 ESSENTIAL HYPERTENSION: ICD-10-CM

## 2020-08-06 RX ORDER — LISINOPRIL 30 MG/1
TABLET ORAL
Qty: 90 TAB | Refills: 0 | Status: SHIPPED | OUTPATIENT
Start: 2020-08-06 | End: 2020-11-06

## 2020-09-28 ENCOUNTER — OFFICE VISIT (OUTPATIENT)
Dept: FAMILY MEDICINE CLINIC | Age: 60
End: 2020-09-28
Payer: COMMERCIAL

## 2020-09-28 VITALS
OXYGEN SATURATION: 97 % | WEIGHT: 164 LBS | HEIGHT: 68 IN | RESPIRATION RATE: 20 BRPM | BODY MASS INDEX: 24.86 KG/M2 | TEMPERATURE: 97.4 F | SYSTOLIC BLOOD PRESSURE: 137 MMHG | DIASTOLIC BLOOD PRESSURE: 88 MMHG | HEART RATE: 63 BPM

## 2020-09-28 DIAGNOSIS — I10 ESSENTIAL HYPERTENSION: ICD-10-CM

## 2020-09-28 DIAGNOSIS — Z00.00 ANNUAL PHYSICAL EXAM: Primary | ICD-10-CM

## 2020-09-28 DIAGNOSIS — Z12.11 SCREEN FOR COLON CANCER: ICD-10-CM

## 2020-09-28 PROCEDURE — 99396 PREV VISIT EST AGE 40-64: CPT | Performed by: FAMILY MEDICINE

## 2020-09-28 NOTE — PATIENT INSTRUCTIONS
DASH Diet: Care Instructions Your Care Instructions The DASH diet is an eating plan that can help lower your blood pressure. DASH stands for Dietary Approaches to Stop Hypertension. Hypertension is high blood pressure. The DASH diet focuses on eating foods that are high in calcium, potassium, and magnesium. These nutrients can lower blood pressure. The foods that are highest in these nutrients are fruits, vegetables, low-fat dairy products, nuts, seeds, and legumes. But taking calcium, potassium, and magnesium supplements instead of eating foods that are high in those nutrients does not have the same effect. The DASH diet also includes whole grains, fish, and poultry. The DASH diet is one of several lifestyle changes your doctor may recommend to lower your high blood pressure. Your doctor may also want you to decrease the amount of sodium in your diet. Lowering sodium while following the DASH diet can lower blood pressure even further than just the DASH diet alone. Follow-up care is a key part of your treatment and safety. Be sure to make and go to all appointments, and call your doctor if you are having problems. It's also a good idea to know your test results and keep a list of the medicines you take. How can you care for yourself at home? Following the DASH diet · Eat 4 to 5 servings of fruit each day. A serving is 1 medium-sized piece of fruit, ½ cup chopped or canned fruit, 1/4 cup dried fruit, or 4 ounces (½ cup) of fruit juice. Choose fruit more often than fruit juice. · Eat 4 to 5 servings of vegetables each day. A serving is 1 cup of lettuce or raw leafy vegetables, ½ cup of chopped or cooked vegetables, or 4 ounces (½ cup) of vegetable juice. Choose vegetables more often than vegetable juice. · Get 2 to 3 servings of low-fat and fat-free dairy each day. A serving is 8 ounces of milk, 1 cup of yogurt, or 1 ½ ounces of cheese. · Eat 6 to 8 servings of grains each day. A serving is 1 slice of bread, 1 ounce of dry cereal, or ½ cup of cooked rice, pasta, or cooked cereal. Try to choose whole-grain products as much as possible. · Limit lean meat, poultry, and fish to 2 servings each day. A serving is 3 ounces, about the size of a deck of cards. · Eat 4 to 5 servings of nuts, seeds, and legumes (cooked dried beans, lentils, and split peas) each week. A serving is 1/3 cup of nuts, 2 tablespoons of seeds, or ½ cup of cooked beans or peas. · Limit fats and oils to 2 to 3 servings each day. A serving is 1 teaspoon of vegetable oil or 2 tablespoons of salad dressing. · Limit sweets and added sugars to 5 servings or less a week. A serving is 1 tablespoon jelly or jam, ½ cup sorbet, or 1 cup of lemonade. · Eat less than 2,300 milligrams (mg) of sodium a day. If you limit your sodium to 1,500 mg a day, you can lower your blood pressure even more. Tips for success · Start small. Do not try to make dramatic changes to your diet all at once. You might feel that you are missing out on your favorite foods and then be more likely to not follow the plan. Make small changes, and stick with them. Once those changes become habit, add a few more changes. · Try some of the following: ? Make it a goal to eat a fruit or vegetable at every meal and at snacks. This will make it easy to get the recommended amount of fruits and vegetables each day. ? Try yogurt topped with fruit and nuts for a snack or healthy dessert. ? Add lettuce, tomato, cucumber, and onion to sandwiches. ? Combine a ready-made pizza crust with low-fat mozzarella cheese and lots of vegetable toppings. Try using tomatoes, squash, spinach, broccoli, carrots, cauliflower, and onions. ? Have a variety of cut-up vegetables with a low-fat dip as an appetizer instead of chips and dip. ? Sprinkle sunflower seeds or chopped almonds over salads.  Or try adding chopped walnuts or almonds to cooked vegetables. ? Try some vegetarian meals using beans and peas. Add garbanzo or kidney beans to salads. Make burritos and tacos with mashed quiñones beans or black beans. Where can you learn more? Go to http://lorna-galilea.info/ Enter F962 in the search box to learn more about \"DASH Diet: Care Instructions. \" Current as of: December 16, 2019               Content Version: 12.6 © 5782-0305 [employer]. Care instructions adapted under license by [employer] (which disclaims liability or warranty for this information). If you have questions about a medical condition or this instruction, always ask your healthcare professional. [address] any warranty or liability for your use of this information.

## 2020-09-28 NOTE — PROGRESS NOTES
Chief Complaint   Patient presents with   [facility] Documentation     Body mass index is 24.94 kg/m². 1. Have you been to the ER, urgent care clinic since your last visit? Hospitalized since your last visit? No    2. Have you seen or consulted any other health care providers outside of the [address] [place] [address] since your last visit? Include any pap smears or colon screening. No    Reviewed record in preparation for visit and have necessary documentation  Pt did not bring medication to office visit for review  Information was given to pt on Advanced Directives, Living Will  Information was given on Shingles Vaccine  Opportunity was given for questions  Goals that were addressed and/or need to be completed after this appointment include:     Health Maintenance Due   Topic Date Due    Colonoscopy  07/18/1978    Shingrix Vaccine Age 50> (1 of 2) 07/18/2010    Flu Vaccine (1) 09/01/2020     Patient refused flu shot.

## 2020-09-28 NOTE — PROGRESS NOTES
Patient: Jeannette Madison MRN: [id number]  SSN: [id number]    YOB: 1960  Age: 61 y.o. Sex: male        Subjective:     Chief Complaint   Patient presents with    Labs    Documentation       HPI: he is a 61y.o. year old male who presents for follow up of HTN and annual PE with lab work for employer. Patient feeling good sans other complaints or concerns at this time. Hypertension:  The patient reports that he is taking medications as instructed, no medication side effects noted, no dyspnea on exertion, no swelling of ankles. BP Readings from Last 3 Encounters:   09/28/20 137/88   12/18/19 123/86   10/16/19 125/84     Lab Results   Component Value Date/Time    Sodium 141 12/18/2019 03:41 PM    Potassium 4.6 12/18/2019 03:41 PM    Chloride 107 12/18/2019 03:41 PM    CO2 33 (H) 12/18/2019 03:41 PM    Anion gap 1 (L) 12/18/2019 03:41 PM    Glucose 86 12/18/2019 03:41 PM    BUN 16 12/18/2019 03:41 PM    Creatinine 0.94 12/18/2019 03:41 PM    BUN/Creatinine ratio 17 12/18/2019 03:41 PM    GFR est AA >60 12/18/2019 03:41 PM    GFR est non-AA >60 12/18/2019 03:41 PM    Calcium 9.3 12/18/2019 03:41 PM    Bilirubin, total 0.5 12/18/2019 03:41 PM    ALT (SGPT) 26 12/18/2019 03:41 PM    Alk. phosphatase 58 12/18/2019 03:41 PM    Protein, total 7.4 12/18/2019 03:41 PM    Albumin 4.1 12/18/2019 03:41 PM    Globulin 3.3 12/18/2019 03:41 PM    A-G Ratio 1.2 12/18/2019 03:41 PM          Patient advised to log blood pressures at home 2-3 times monthly and bring to next visit. Call office as soon as possible if BP's over 140/90 on multiple occasions or with symptoms of dizziness, chest pain, shortness of breath, headache or ankle swelling. Our goal is to normalize the blood pressure to decrease the risks of strokes and heart attacks. The patient is in agreement with the plan. Encounter Diagnoses   Name Primary?     Annual physical exam Yes    Essential hypertension     Screen for colon cancer Current and past medical information:    Current Medications after this visit[de-identified]     Current Outpatient Medications   Medication Sig    lisinopriL (PRINIVIL, ZESTRIL) 30 mg tablet Take 1 tablet by mouth once daily    ibuprofen (MOTRIN) 200 mg tablet Take  by mouth.  Blood Pressure Test Kit-Wrist kit     Blood Pressure Monitor kit Check blood pressure 2-3 times weekly. For Hypertension. No current facility-administered medications for this visit. Patient Active Problem List    Diagnosis Date Noted    Vitamin D deficiency 11/20/2014    GERD (gastroesophageal reflux disease) 11/14/2012    HTN (hypertension) 05/24/2010       Past Medical History:   Diagnosis Date    HTN (hypertension) 5/24/2010       Allergies   Allergen Reactions    Codeine Nausea and Vomiting       History reviewed. No pertinent surgical history.     Social History     Socioeconomic History    Marital status:      Spouse name: Not on file    Number of children: Not on file    Years of education: Not on file    Highest education level: Not on file   Tobacco Use    Smoking status: Never Smoker    Smokeless tobacco: Never Used   Substance and Sexual Activity    Alcohol use: No    Drug use: No         Objective:     Review of Systems:  Constitutional: Negative for fatigue or malaise  Derm: Negative for rash or lesion  HEENT: Negative for acute hearing or vision changes  Cardiovascular: Negative for dizziness, chest pain or palpitations  Respiratory: Negative for cough, wheezing or SOB  Gastreintestinal: Negative for nausea or abdominal pain  Genital/urinary: Negative for dysuria or voiding dysfunction  Muscoloskeletal: Negative for acute myalgias or arthralgias   Neurological: Negative for headache, weakness or paresthesia  Psychological: Negative for for depression or anxiety     Vitals:    09/28/20 0932   BP: 137/88   Pulse: 63   Resp: 20   Temp: 97.4 °F (36.3 °C)   TempSrc: Oral   SpO2: 97%   Weight: 164 lb (74.4 kg)   Height: 5' 8\" (1.727 m)      Body mass index is 24.94 kg/m². Physical Exam:  Constitutional: well developed, well nourished, in no acute distress  Skin: warm and dry, normal tone and turgor  Head: normocephalic, atraumatic  Eyes: sclera clear, EOMI, PERRL  Neck: normal range of motion  Cardiovascular: normal S1, S2, regular rate and rhythm  Respiratory: clear to auscultation bilaterally with symmetrical effort  Abdomen: soft, BS normal  Extremities: full range of motion, normal gait  Neurology: normal strength and sensation  Psych: active, alert and oriented, affect appropriate       Assessment and orders:       ICD-10-CM ICD-9-CM    1. Annual physical exam  Z00.00 V70.0    2. Essential hypertension  I10 401.9 LIPID PANEL      METABOLIC PANEL, COMPREHENSIVE      TSH 3RD GENERATION   3. Screen for colon cancer  Z12.11 V76.51 OCCULT BLOOD IMMUNOASSAY,DIAGNOSTIC         Plan of care:  Diagnoses were discussed in detail with patient. Medication risks/benefits/side effects discussed with patient. All of the patient's questions were addressed. The patient understands and agrees with our plan of care. The patient knows to call back if they are unsure of or forgets any changes we discussed today or if the symptoms change. The patient received an After-Visit Summary which contains VS, diagnoses, orders, allergy and medication lists. Patient Care Team:  Shalini Broderick MD as PCP - General (Family Medicine)  Shalini Broderick MD as PCP - [address] [place] [address] Provider  Dayron Laura MD (Cardiology)    Follow-up and Dispositions    · Return in about 6 months (around 3/28/2021), or if symptoms worsen or fail to improve. No future appointments.     Signed By: Osmel Brady MD     September 28, 2020

## 2020-10-17 LAB
HEMOCCULT STL QL IA: NEGATIVE
SPECIMEN STATUS REPORT, ROLRST: NORMAL

## 2021-05-07 ENCOUNTER — OFFICE VISIT (OUTPATIENT)
Dept: FAMILY MEDICINE CLINIC | Age: 61
End: 2021-05-07
Payer: COMMERCIAL

## 2021-05-07 VITALS
SYSTOLIC BLOOD PRESSURE: 167 MMHG | HEIGHT: 68 IN | BODY MASS INDEX: 25.58 KG/M2 | RESPIRATION RATE: 16 BRPM | WEIGHT: 168.8 LBS | HEART RATE: 57 BPM | DIASTOLIC BLOOD PRESSURE: 86 MMHG | OXYGEN SATURATION: 99 % | TEMPERATURE: 98 F

## 2021-05-07 DIAGNOSIS — I10 ESSENTIAL HYPERTENSION: Primary | ICD-10-CM

## 2021-05-07 DIAGNOSIS — R35.1 NOCTURIA: ICD-10-CM

## 2021-05-07 LAB
ALBUMIN SERPL-MCNC: 4.2 G/DL (ref 3.5–5)
ALBUMIN/GLOB SERPL: 1.2 {RATIO} (ref 1.1–2.2)
ALP SERPL-CCNC: 59 U/L (ref 45–117)
ALT SERPL-CCNC: 24 U/L (ref 12–78)
ANION GAP SERPL CALC-SCNC: 5 MMOL/L (ref 5–15)
AST SERPL-CCNC: 19 U/L (ref 15–37)
BILIRUB SERPL-MCNC: 1 MG/DL (ref 0.2–1)
BUN SERPL-MCNC: 14 MG/DL (ref 6–20)
BUN/CREAT SERPL: 18 (ref 12–20)
CALCIUM SERPL-MCNC: 9.1 MG/DL (ref 8.5–10.1)
CHLORIDE SERPL-SCNC: 107 MMOL/L (ref 97–108)
CHOLEST SERPL-MCNC: 163 MG/DL
CO2 SERPL-SCNC: 28 MMOL/L (ref 21–32)
COMMENT, HOLDF: NORMAL
CREAT SERPL-MCNC: 0.76 MG/DL (ref 0.7–1.3)
ERYTHROCYTE [DISTWIDTH] IN BLOOD BY AUTOMATED COUNT: 12.4 % (ref 11.5–14.5)
GLOBULIN SER CALC-MCNC: 3.5 G/DL (ref 2–4)
GLUCOSE SERPL-MCNC: 75 MG/DL (ref 65–100)
HCT VFR BLD AUTO: 45.5 % (ref 36.6–50.3)
HDLC SERPL-MCNC: 51 MG/DL
HDLC SERPL: 3.2 {RATIO} (ref 0–5)
HGB BLD-MCNC: 14.8 G/DL (ref 12.1–17)
LDLC SERPL CALC-MCNC: 100.4 MG/DL (ref 0–100)
LIPID PROFILE,FLP: ABNORMAL
MCH RBC QN AUTO: 31.4 PG (ref 26–34)
MCHC RBC AUTO-ENTMCNC: 32.5 G/DL (ref 30–36.5)
MCV RBC AUTO: 96.4 FL (ref 80–99)
NRBC # BLD: 0 K/UL (ref 0–0.01)
NRBC BLD-RTO: 0 PER 100 WBC
PLATELET # BLD AUTO: 212 K/UL (ref 150–400)
PMV BLD AUTO: 11.6 FL (ref 8.9–12.9)
POTASSIUM SERPL-SCNC: 4.7 MMOL/L (ref 3.5–5.1)
PROT SERPL-MCNC: 7.7 G/DL (ref 6.4–8.2)
PSA SERPL-MCNC: 0.6 NG/ML (ref 0.01–4)
RBC # BLD AUTO: 4.72 M/UL (ref 4.1–5.7)
SAMPLES BEING HELD,HOLD: NORMAL
SODIUM SERPL-SCNC: 140 MMOL/L (ref 136–145)
TRIGL SERPL-MCNC: 58 MG/DL (ref ?–150)
TSH SERPL DL<=0.05 MIU/L-ACNC: 1.34 UIU/ML (ref 0.36–3.74)
VLDLC SERPL CALC-MCNC: 11.6 MG/DL
WBC # BLD AUTO: 5.6 K/UL (ref 4.1–11.1)

## 2021-05-07 PROCEDURE — 99396 PREV VISIT EST AGE 40-64: CPT | Performed by: FAMILY MEDICINE

## 2021-05-07 RX ORDER — LISINOPRIL 30 MG/1
TABLET ORAL
Qty: 90 TAB | Refills: 1 | Status: SHIPPED | OUTPATIENT
Start: 2021-05-07 | End: 2021-11-04

## 2021-05-07 NOTE — PROGRESS NOTES
1. Have you been to the ER, urgent care clinic since your last visit? Hospitalized since your last visit? No    2. Have you seen or consulted any other health care providers outside of the [address] [place] [address] since your last visit? Include any pap smears or colon screening.  No  Reviewed record in preparation for visit and have necessary documentation    Goals that were addressed and/or need to be completed during or after this appointment include     Health Maintenance Due   Topic Date Due    COVID-19 Vaccine (1) Never done    DTaP/Tdap/Td series (1 - Tdap) Never done    Shingrix Vaccine Age 50> (1 of 2) Never done

## 2021-05-10 NOTE — PROGRESS NOTES
Patient: Macey Cornell MRN: [id number]  SSN: [id number]    YOB: 1960  Age: 61 y.o. Sex: male        Subjective:     Chief Complaint   Patient presents with    Complete Physical       HPI: he is a 61y.o. year old male who presents for follow up of HTN. 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 He will loss his health insurance. Patient feeling good sans other complaints or concerns at this time. Hypertension:  The patient reports that he is taking medications as instructed, no medication side effects noted, no dyspnea on exertion, no swelling of ankles. BP Readings from Last 3 Encounters:   05/07/21 (!) 167/86   09/28/20 137/88   12/18/19 123/86     Lab Results   Component Value Date/Time    Sodium 140 05/07/2021 10:40 AM    Potassium 4.7 05/07/2021 10:40 AM    Chloride 107 05/07/2021 10:40 AM    CO2 28 05/07/2021 10:40 AM    Anion gap 5 05/07/2021 10:40 AM    Glucose 75 05/07/2021 10:40 AM    BUN 14 05/07/2021 10:40 AM    Creatinine 0.76 05/07/2021 10:40 AM    BUN/Creatinine ratio 18 05/07/2021 10:40 AM    GFR est AA >60 05/07/2021 10:40 AM    GFR est non-AA >60 05/07/2021 10:40 AM    Calcium 9.1 05/07/2021 10:40 AM    Bilirubin, total 1.0 05/07/2021 10:40 AM    ALT (SGPT) 24 05/07/2021 10:40 AM    Alk. phosphatase 59 05/07/2021 10:40 AM    Protein, total 7.7 05/07/2021 10:40 AM    Albumin 4.2 05/07/2021 10:40 AM    Globulin 3.5 05/07/2021 10:40 AM    A-G Ratio 1.2 05/07/2021 10:40 AM          Patient advised to log blood pressures at home 2-3 times monthly and bring to next visit. Call office as soon as possible if BP's over 140/90 on multiple occasions or with symptoms of dizziness, chest pain, shortness of breath, headache or ankle swelling. Our goal is to normalize the blood pressure to decrease the risks of strokes and heart attacks. The patient is in agreement with the plan. Encounter Diagnoses   Name Primary?     Essential hypertension Yes    Nocturia Current and past medical information:    Current Medications after this visit[de-identified]     Current Outpatient Medications   Medication Sig    lisinopriL (PRINIVIL, ZESTRIL) 30 mg tablet Take 1 tablet by mouth once daily    ibuprofen (MOTRIN) 200 mg tablet Take  by mouth.  Blood Pressure Monitor kit Check blood pressure 2-3 times weekly. For Hypertension.  Blood Pressure Test Kit-Wrist kit      No current facility-administered medications for this visit. Patient Active Problem List    Diagnosis Date Noted    Vitamin D deficiency 11/20/2014    GERD (gastroesophageal reflux disease) 11/14/2012    HTN (hypertension) 05/24/2010       Past Medical History:   Diagnosis Date    HTN (hypertension) 5/24/2010       Allergies   Allergen Reactions    Codeine Nausea and Vomiting       History reviewed. No pertinent surgical history.     Social History     Socioeconomic History    Marital status:      Spouse name: Not on file    Number of children: Not on file    Years of education: Not on file    Highest education level: Not on file   Tobacco Use    Smoking status: Never Smoker    Smokeless tobacco: Never Used   Substance and Sexual Activity    Alcohol use: No    Drug use: No         Objective:     Review of Systems:  Constitutional: Negative for fatigue or malaise  Derm: Negative for rash or lesion  HEENT: Negative for acute hearing or vision changes  Cardiovascular: Negative for dizziness, chest pain or palpitations  Respiratory: Negative for cough, wheezing or SOB  Gastreintestinal: Negative for nausea or abdominal pain  Genital/urinary: Negative for dysuria or voiding dysfunction  Muscoloskeletal: Negative for acute myalgias or arthralgias   Neurological: Negative for headache, weakness or paresthesia  Psychological: Negative for for depression or anxiety     Vitals:    05/07/21 0928 05/07/21 1037   BP: (!) 146/83 (!) 167/86   Pulse: (!) 57    Resp: 16    Temp: 98 °F (36.7 °C)    TempSrc: Oral    SpO2: 99%    Weight: 168 lb 12.8 oz (76.6 kg)    Height: 5' 8\" (1.727 m)       Body mass index is 25.67 kg/m². Physical Exam:  Constitutional: well developed, well nourished, in no acute distress  Skin: warm and dry, normal tone and turgor  Head: normocephalic, atraumatic  Eyes: sclera clear, EOMI, PERRL  Neck: normal range of motion  Cardiovascular: normal S1, S2, regular rate and rhythm  Respiratory: clear to auscultation bilaterally with symmetrical effort  Abdomen: soft, BS normal  Extremities: full range of motion, normal gait  Neurology: normal strength and sensation  Psych: active, alert and oriented, affect appropriate       Assessment and orders:       ICD-10-CM ICD-9-CM    1. Essential hypertension  I10 401.9 LIPID PANEL      METABOLIC PANEL, COMPREHENSIVE      TSH 3RD GENERATION      CBC W/O DIFF      CBC W/O DIFF      TSH 3RD GENERATION      METABOLIC PANEL, COMPREHENSIVE      LIPID PANEL      lisinopriL (PRINIVIL, ZESTRIL) 30 mg tablet   2. Nocturia  R35.1 788.43 PSA, DIAGNOSTIC (PROSTATE SPECIFIC AG)      PSA, DIAGNOSTIC (PROSTATE SPECIFIC AG)         Plan of care:  Diagnoses were discussed in detail with patient. Medication risks/benefits/side effects discussed with patient. All of the patient's questions were addressed. The patient understands and agrees with our plan of care. The patient knows to call back if they are unsure of or forgets any changes we discussed today or if the symptoms change. The patient received an After-Visit Summary which contains VS, diagnoses, orders, allergy and medication lists. Patient Care Team:  Reena Venegas MD as PCP - General (Family Medicine)  Reena Venegas MD as PCP - [facility]  Shea Rosas MD (Cardiology)        No future appointments.     Signed By: Milan Membreno MD     May 9, 2021

## 2022-05-26 ENCOUNTER — OFFICE VISIT (OUTPATIENT)
Dept: FAMILY MEDICINE CLINIC | Age: 62
End: 2022-05-26
Payer: COMMERCIAL

## 2022-05-26 VITALS
DIASTOLIC BLOOD PRESSURE: 90 MMHG | OXYGEN SATURATION: 98 % | TEMPERATURE: 97.7 F | WEIGHT: 167.6 LBS | HEART RATE: 60 BPM | RESPIRATION RATE: 16 BRPM | BODY MASS INDEX: 25.48 KG/M2 | SYSTOLIC BLOOD PRESSURE: 142 MMHG

## 2022-05-26 DIAGNOSIS — Z00.00 ANNUAL PHYSICAL EXAM: Primary | ICD-10-CM

## 2022-05-26 DIAGNOSIS — I10 ESSENTIAL HYPERTENSION: ICD-10-CM

## 2022-05-26 PROCEDURE — 99396 PREV VISIT EST AGE 40-64: CPT | Performed by: FAMILY MEDICINE

## 2022-05-26 NOTE — PROGRESS NOTES
1. Have you been to the ER, urgent care clinic since your last visit? Hospitalized since your last visit? No    2. Have you seen or consulted any other health care providers outside of the [address] [place] [address] since your last visit? Include any pap smears or colon screening. No    3. For patients aged 39-70: Has the patient had a colonoscopy / FIT/ Cologuard? Stool Sample test bfpc     If the patient is female:    4. For patients aged 41-77: Has the patient had a mammogram within the past 2 years? NA - based on age or sex      11. For patients aged 21-65: Has the patient had a pap smear? NA - based on age or sex     Reviewed record in preparation for visit and have necessary documentation  Pt did not bring medication to office visit for review  Patient is accompanied by self I have received verbal consent from Chel Peraza to discuss any/all medical information while they are present in the room.     Goals that were addressed and/or need to be completed during or after this appointment include     Health Maintenance Due   Topic Date Due    COVID-19 Vaccine (1) Never done    DTaP/Tdap/Td series (1 - Tdap) Never done    Shingrix Vaccine Age 50> (1 of 2) Never done    Colorectal Cancer Screening Combo  10/15/2021    Depression Screen  05/07/2022

## 2022-05-26 NOTE — PROGRESS NOTES
Patient: Chel Peraza MRN: [id number]  SSN: [id number]    YOB: 1960  Age: 64 y.o. Sex: male        Subjective:     Chief Complaint   Patient presents with    Physical    Follow Up Chronic Condition       HPI: he is a 64y.o. year old male who presents for annual exam. Patient with hx of HTN. 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 He pays for his own health insurance and is unsure of coverage. He says his BP when measured at home is much lower than seen in office. He is due for lab work. Patient feeling good sans other complaints or concerns at this time. Hypertension:  The patient reports that he is taking medications as instructed, no medication side effects noted, no dyspnea on exertion, no swelling of ankles. BP Readings from Last 3 Encounters:   05/26/22 (!) 142/90   05/07/21 (!) 167/86   09/28/20 137/88     Lab Results   Component Value Date/Time    Sodium 140 05/07/2021 10:40 AM    Potassium 4.7 05/07/2021 10:40 AM    Chloride 107 05/07/2021 10:40 AM    CO2 28 05/07/2021 10:40 AM    Anion gap 5 05/07/2021 10:40 AM    Glucose 75 05/07/2021 10:40 AM    BUN 14 05/07/2021 10:40 AM    Creatinine 0.76 05/07/2021 10:40 AM    BUN/Creatinine ratio 18 05/07/2021 10:40 AM    GFR est AA >60 05/07/2021 10:40 AM    GFR est non-AA >60 05/07/2021 10:40 AM    Calcium 9.1 05/07/2021 10:40 AM    Bilirubin, total 1.0 05/07/2021 10:40 AM    ALT (SGPT) 24 05/07/2021 10:40 AM    Alk. phosphatase 59 05/07/2021 10:40 AM    Protein, total 7.7 05/07/2021 10:40 AM    Albumin 4.2 05/07/2021 10:40 AM    Globulin 3.5 05/07/2021 10:40 AM    A-G Ratio 1.2 05/07/2021 10:40 AM          Patient advised to log blood pressures at home 2-3 times monthly and bring to next visit. Call office as soon as possible if BP's over 140/90 on multiple occasions or with symptoms of dizziness, chest pain, shortness of breath, headache or ankle swelling.   Our goal is to normalize the blood pressure to decrease the risks of strokes and heart attacks. The patient is in agreement with the plan. Encounter Diagnoses   Name Primary?  Annual physical exam Yes    Essential hypertension        Current and past medical information:    Current Medications after this visit[de-identified]     Current Outpatient Medications   Medication Sig    lisinopriL (PRINIVIL, ZESTRIL) 30 mg tablet Take 1 tablet by mouth once daily    ibuprofen (MOTRIN) 200 mg tablet Take  by mouth.  Blood Pressure Test Kit-Wrist kit     Blood Pressure Monitor kit Check blood pressure 2-3 times weekly. For Hypertension. No current facility-administered medications for this visit. Patient Active Problem List    Diagnosis Date Noted    Vitamin D deficiency 11/20/2014    GERD (gastroesophageal reflux disease) 11/14/2012    HTN (hypertension) 05/24/2010       Past Medical History:   Diagnosis Date    HTN (hypertension) 5/24/2010       Allergies   Allergen Reactions    Codeine Nausea and Vomiting       History reviewed. No pertinent surgical history.     Social History     Socioeconomic History    Marital status:    Tobacco Use    Smoking status: Never Smoker    Smokeless tobacco: Never Used   Substance and Sexual Activity    Alcohol use: No    Drug use: No         Objective:     Review of Systems:  Constitutional: Negative for fatigue or malaise  Derm: Negative for rash or lesion  HEENT: Negative for acute hearing or vision changes  Cardiovascular: Negative for dizziness, chest pain or palpitations  Respiratory: Negative for cough, wheezing or SOB  Gastreintestinal: Negative for nausea or abdominal pain  Genital/urinary: Negative for dysuria or voiding dysfunction  Muscoloskeletal: Negative for acute myalgias or arthralgias   Neurological: Negative for headache, weakness or paresthesia  Psychological: Negative for for depression or anxiety     Vitals:    05/26/22 0805 05/26/22 0808   BP: (!) 149/91 [address] Kil ) 142/90   Pulse: 60    Resp: 16    Temp: 97.7 °F (36.5 °C)    SpO2: 98%    Weight: 167 lb 9.6 oz (76 kg)       Body mass index is 25.48 kg/m². Physical Exam:  Constitutional: well developed, well nourished, in no acute distress  Skin: warm and dry, normal tone   Head: normocephalic, atraumatic  Eyes: sclera clear, EOMI  Neck: normal range of motion  Cardiovascular: normal S1, S2, regular rate and rhythm  Respiratory: clear to auscultation bilaterally with symmetrical effort  Abdomen: BS normal  Extremities: full range of motion, normal gait  Neurology: normal strength and sensation  Psych: active, alert and oriented, affect appropriate       Assessment and orders:       ICD-10-CM ICD-9-CM    1. Annual physical exam  Z00.00 V70.0 LIPID PANEL      METABOLIC PANEL, COMPREHENSIVE      METABOLIC PANEL, COMPREHENSIVE      LIPID PANEL   2. Essential hypertension  I10 401.9 LIPID PANEL      METABOLIC PANEL, COMPREHENSIVE      METABOLIC PANEL, COMPREHENSIVE      LIPID PANEL         Plan of care:  Diagnoses were discussed in detail with patient. Medications reviewed and appropriate. Patient to continue current prescribed medications as written. Medication risks/benefits/side effects discussed with patient. All of the patient's questions were addressed. The patient understands and agrees with our plan of care. The patient knows to call back if they are unsure of or forgets any changes we discussed today or if the symptoms change. The patient received an After-Visit Summary which contains VS, diagnoses, orders, allergy and medication lists. Patient Care Team:  Aliza Figueroa MD as PCP - General (Family Medicine)  Aliza Figueroa MD as PCP - [facility]  Aquiles Mccann MD (Cardiovascular Disease Physician)    Follow-up and Dispositions    · Return in about 1 year (around 5/26/2023). No future appointments.     Signed By: Yuriy Rosen MD     May 26, 2022

## 2022-05-28 LAB
ALBUMIN SERPL-MCNC: 4.2 G/DL (ref 3.8–4.8)
ALBUMIN/GLOB SERPL: 1.5 {RATIO} (ref 1.2–2.2)
ALP SERPL-CCNC: 56 IU/L (ref 44–121)
ALT SERPL-CCNC: 10 IU/L (ref 0–44)
AST SERPL-CCNC: 15 IU/L (ref 0–40)
BILIRUB SERPL-MCNC: 0.7 MG/DL (ref 0–1.2)
BUN SERPL-MCNC: 17 MG/DL (ref 8–27)
BUN/CREAT SERPL: 19 (ref 10–24)
CALCIUM SERPL-MCNC: 8.9 MG/DL (ref 8.6–10.2)
CHLORIDE SERPL-SCNC: 102 MMOL/L (ref 96–106)
CHOLEST SERPL-MCNC: 158 MG/DL (ref 100–199)
CO2 SERPL-SCNC: 24 MMOL/L (ref 20–29)
CREAT SERPL-MCNC: 0.91 MG/DL (ref 0.76–1.27)
EGFR: 96 ML/MIN/1.73
GLOBULIN SER CALC-MCNC: 2.8 G/DL (ref 1.5–4.5)
GLUCOSE SERPL-MCNC: 92 MG/DL (ref 65–99)
HDLC SERPL-MCNC: 50 MG/DL
LDLC SERPL CALC-MCNC: 97 MG/DL (ref 0–99)
POTASSIUM SERPL-SCNC: 4.5 MMOL/L (ref 3.5–5.2)
PROT SERPL-MCNC: 7 G/DL (ref 6–8.5)
SODIUM SERPL-SCNC: 139 MMOL/L (ref 134–144)
TRIGL SERPL-MCNC: 52 MG/DL (ref 0–149)
VLDLC SERPL CALC-MCNC: 11 MG/DL (ref 5–40)

## 2022-09-26 NOTE — PROGRESS NOTES
[address] [facility] [address] [facility], [address] [facility]  [phone]    Patient Name:  Shaina Westfall  YOB: 1961      Office Visit 9/27/2022    CHIEF COMPLAINT:    Chief Complaint   Patient presents with    Results       HISTORY OF PRESENT ILLNESS:      The patient presents in outpatient consultation at the request of Óscar HAWKINS for management of obstructive sleep apnea. Significant PMH of hypertension, hypothyroidism, aortic regurgitation, and migraine headaches. She reports that she had COVID in March 2021 and after her hospitalization she was on oxygen at home. States that her primary provider had to place an order for her to be released from oxygen but she wanted her to have a sleep study first.  She states she wakes up energized most mornings of the week. Reports some slight fatigue during the day but not excessive enough to take any naps. [place] score is 10/24. She denies morning headaches. States that she does snore and she has snored for years. States that she usually sleeps in the side-lying position or on her stomach. She reports sleeping from 11-12 AM to 6 AM and she awakens to an alarm. States that she may get up 1 time during the night to use the bathroom. She denies any history of having any problems going to sleep. Reports she may have 1 caffeine drink in the mornings but very rarely in the afternoons. Will occasionally drink alcohol but denies any tobacco use or illicit drug use. States her weight has been consistently around 185 over the last year. Denies any swelling in her ankles or feet. Her blood pressure is controlled today. The diagnostic polysomnography was notable for an apnea hypopnea index of 25.0 including 54 obstructive apneas and 131 hypopneas. Oxygen desaturations are low as 60% were noted with SpO2 less than 89% for a total of 270.7 minutes of the test.  Significant cardiac arrhythmias were not evident.   A Eagle Louise      1960   Disha Duenas MD  Date of Visit-2019     [facility],  PCP=Arnold Yates MD     [facility] [address] [place] [address] [facility] [place]. HPI:   Tracy Queen is a 62 y.o. male   Refer for CP and HTN  Wife passed from MI in late winter  Has episodic chest pain  EKG 5-3-19 =Sinus  Rhythm   -Nonspecific QRS widening and anterior fascicular block. -consider old anterior infarct. For HTN on ACE    Has  Pain at both nipples radiates to mid chest, less lately , was severe when wife   Denies family hx of CAD, does not smoke  Works in [email or web address] 5 -12 hour days switching to 7 - 8 hour days  hsa 3 kids one dtr in [facility], he lives with grandson who is 21 in [place]      Assessment/Plans:  1. Chest tightness    2. Essential hypertension    3. Abnormal EKG    4. Grief       That infarct on EKG is not very specific, but it is notable for the QRS being wide. Chest pain with emotional stress abnormal EKG  Needs stress echo      No future appointments. Cardiac History:   No specialty comments available. ROS:Cardiac complete  as above. Respiratory as above with no wheezing or hemoptysis. He denies  symptoms of unusual weight loss , fevers,  BRBPR, hematuria,  or recent stroke    Past Medical History:   Diagnosis Date    HTN (hypertension) 2010      Exam and Labs:  Visit Vitals  /86 (BP 1 Location: Right arm, BP Patient Position: Sitting)   Pulse 74   Temp 97.9 °F (36.6 °C) (Oral)   Resp 16   Ht 5' 8\" (1.727 m)   Wt 178 lb (80.7 kg)   BMI 27.06 kg/m²     Constitutional:  NAD, comfortable , moist mucous membranesHENT: Head: NC,ATEyes: No scleral icterus. Neck:  Neck supple. No JVD present. No tracheal deviation,mass  Chest: Effort normal & normal respiratory excursion     Lungs:breath sounds normal. No stridor. distress, wheezes or  Rales.   Heart:normal rate, regular rhythm, normal S1, S2, no murmurs, rubs, clicks or gallops , PMI non displaced. Edema: Edema is none. Extremities:  no clubbing or cyanosis. Abdominal:  no abnormal distension. Neurological: alert, conversant and oriented . Skin: Skin is not cold. No obvious systemic rash noted. Not diaphoretic. No erythema. Psychiatric:  Grossly normal mood and affect. Behavior appears normal.     Lab Results   Component Value Date/Time    Cholesterol, total 145 05/03/2019 02:47 PM    HDL Cholesterol 44 05/03/2019 02:47 PM    LDL, calculated 85 05/03/2019 02:47 PM    Triglyceride 81 05/03/2019 02:47 PM    CHOL/HDL Ratio 4.2 09/28/2010 10:09 AM     Lab Results   Component Value Date/Time    Sodium 143 05/03/2019 02:47 PM    Potassium 4.6 05/03/2019 02:47 PM    Chloride 102 05/03/2019 02:47 PM    CO2 29 05/03/2019 02:47 PM    Anion gap 9 09/28/2010 10:09 AM    Glucose 81 05/03/2019 02:47 PM    BUN 11 05/03/2019 02:47 PM    Creatinine 0.89 05/03/2019 02:47 PM    BUN/Creatinine ratio 12 05/03/2019 02:47 PM    GFR est  05/03/2019 02:47 PM    GFR est non-AA 94 05/03/2019 02:47 PM    Calcium 9.4 05/03/2019 02:47 PM      Wt Readings from Last 3 Encounters:   06/26/19 178 lb (80.7 kg)   05/03/19 173 lb (78.5 kg)   11/05/18 176 lb (79.8 kg)      BP Readings from Last 3 Encounters:   06/26/19 131/86   05/03/19 133/87   11/05/18 143/89        Current Outpatient Medications   Medication Sig    Blood Pressure Test Kit-Wrist kit     Blood Pressure Monitor kit Check blood pressure 2-3 times weekly. For Hypertension.  lisinopril (PRINIVIL, ZESTRIL) 30 mg tablet TAKE 1 TABLET BY MOUTH ONCE DAILY     No current facility-administered medications for this visit. No past surgical history on file. Social Hx=  reports that he has never smoked. He has never used smokeless tobacco. He reports that he does not drink alcohol or use drugs. Family Hx= family history is not on file. Impression see above. subsequent CPAP titration study was conducted. CPAP levels as high as 13 cm H2O were performed. CPAP was significantly effective in eliminating disordered breathing. CPAP was tolerated well by the patient. The patient reports feeling good the day following.      SLEEP STUDY HST-8/15/22        CPAP TITRATION STUDY-9/15/22      Sleep Medicine 9/27/2022   Sitting and reading 3   Watching TV 0   Sitting, inactive in a public place (e.g. a theatre or a meeting) 1   As a passenger in a car for an hour without a break 3   Lying down to rest in the afternoon when circumstances permit 3   Sitting and talking to someone 0   Sitting quietly after a lunch without alcohol 0   In a car, while stopped for a few minutes in traffic 0   [place] Sleepiness Score 10              Past Medical History:   Diagnosis Date    Acute medial meniscal tear 12/28/2018    Left    [place] coronary artery calcium score between 200 and 399 02/26/2021    Calcium Score 272    Aortic regurgitation 09/08/2021    Moderate per ECHO    COVID-19 03/11/2021 & 6/22/22    ARDS    Hashimoto's disease     Hemorrhoids 08/28/2017    Internal + External per Colonoscopy    Hyperlipidemia     Hypertension     Hypothyroidism, acquired, autoimmune     Lumbar degenerative disc disease 09/20/2019    Mild Degeneration & Moderate Disc Bulges L3-S1 with Moderate Bilateral L>R Foramen Narrowings    Migraine headache     Osteoarthritis     R Toe, L Hand, Bilateral Knees    Osteopenia 4/23/2015    Shingles 08/26/2021    Trigger ring finger     Right Middle    Vitamin B12 deficiency 12/16/2013    Vitamin D deficiency 12/16/2013         Patient Active Problem List   Diagnosis    Vitamin B12 deficiency    Vitamin D deficiency    Great toe pain    Hypothyroidism, acquired, autoimmune    Acute respiratory failure due to COVID-19 [facility])    Essential hypertension, benign    Right foot pain    Hyperlipidemia with target low density lipoprotein (LDL) cholesterol less than 130 mg/dL Past Surgical History:   Procedure Laterality Date    APPENDECTOMY      COLONOSCOPY  08/28/2017    Due 2027    KNEE ARTHROSCOPY Right 2009    OVARIAN CYST REMOVAL Right     TONSILLECTOMY           Social History     Socioeconomic History    Marital status: Single     Spouse name: Not on file    Number of children: Not on file    Years of education: Not on file    Highest education level: Not on file   Occupational History    Not on file   Tobacco Use    Smoking status: Never    Smokeless tobacco: Never   Vaping Use    Vaping Use: Never used   Substance and Sexual Activity    Alcohol use:  Yes     Alcohol/week: 1.0 standard drink    Drug use: No    Sexual activity: Not on file   Other Topics Concern    Not on file   Social History Narrative    Not on file     Social Determinants of Health     Financial Resource Strain: Not on file   Food Insecurity: Not on file   Transportation Needs: Not on file   Physical Activity: Not on file   Stress: Not on file   Social Connections: Not on file   Intimate Partner Violence: Not on file   Housing Stability: Not on file         Family History   Problem Relation Age of Onset    Cancer Mother         Lung    Breast Cancer Neg Hx     Heart Disease Father     Hypertension Brother     Hypertension Mother     Diabetes Mother     Hypertension Father     Depression Brother          No Known Allergies      Current Outpatient Medications   Medication Sig    Omega-3 Fatty Acids (FISH OIL) 1000 MG CAPS Take by mouth    aspirin 81 MG EC tablet Take 81 mg by mouth daily    ergocalciferol (ERGOCALCIFEROL) 1.25 MG (56894 UT) capsule Take 50,000 Units by mouth every 7 days    glucosamine-chondroitin 500-400 MG CAPS Take 1 capsule by mouth daily    levothyroxine (SYNTHROID) 50 MCG tablet Take 50 mcg by mouth every morning (before breakfast)    lisinopril (PRINIVIL;ZESTRIL) 20 MG tablet Take 20 mg by mouth daily    meloxicam (MOBIC) 7.5 MG tablet Take 7.5 mg by mouth 2 times daily (with meals)    rosuvastatin (CRESTOR) 20 MG tablet Take 20 mg by mouth    zoster recombinant adjuvanted vaccine (SHINGRIX) 50 MCG/0.5ML SUSR injection Inject 0.5 mLs into the muscle See Admin Instructions 1 dose now and repeat in 2-6 months    acetaminophen (TYLENOL) 650 MG extended release tablet Take 650 mg by mouth every 8 hours as needed (Patient not taking: No sig reported)    albuterol sulfate  (90 Base) MCG/ACT inhaler Inhale 2 puffs into the lungs every 4 hours as needed (Patient not taking: No sig reported)    baclofen (LIORESAL) 10 MG tablet Take 10 mg by mouth 3 times daily as needed (Patient not taking: No sig reported)    clobetasol (TEMOVATE) 0.05 % ointment Apply topically 2 times daily (Patient not taking: No sig reported)    Crisaborole (EUCRISA) 2 % OINT Apply thin area to affected areas bid (Patient not taking: Reported on 9/27/2022)     No current facility-administered medications for this visit. REVIEW OF SYSTEMS:   CONSTITUTIONAL:   There is no history of fever, chills, night sweats, weight loss, weight gain, persistent fatigue, or lethargy/hypersomnolence. EYES:   Denies problems with eye pain, erythema, blurred vision, or visual field loss. ENTM:   Denies history of tinnitus, epistaxis, sore throat, hoarseness, or dysphonia. LYMPH:   Denies swollen glands. CARDIAC:   No chest pain, pressure, discomfort, palpitations, orthopnea, murmurs, or edema. GI:   No dysphagia, heartburn reflux, nausea/vomiting, diarrhea, abdominal pain, or bleeding. :   Denies history of dysuria, hematuria, polyuria, or decreased urine output. MS:   No history of myalgias, arthralgias, bone pain, or muscle cramps. SKIN:   No history of rashes, jaundice, cyanosis, nodules, or ulcers. ENDO:   Negative for heat or cold intolerance. No history of DM. PSYCH:   Negative for anxiety, depression, insomnia, hallucinations.    NEURO:   There is no history of AMS, persistent headache, decreased level of consciousness, seizures, or motor or sensory deficits. PHYSICAL EXAM:    Vitals:    09/27/22 1107   BP: 138/84   Pulse: 77   Resp: 14   Temp: 97.7 °F (36.5 °C)   SpO2: 97%   Weight: 185 lb 6.4 oz (84.1 kg)   Height: 5' 2\" (1.575 m)        GENERAL APPEARANCE:   The patient is normal weight and in no respiratory distress. HEENT:   PERRL. Conjunctivae unremarkable. Nasal mucosa is without epistaxis, exudate, or polyps. Nares patent bilateral.  Gums and dentition are unremarkable. There is oropharyngeal narrowing. Suggs score 4. NECK/LYMPHATIC:   Symmetrical with no elevation of jugular venous pulsation. Trachea midline. No thyroid enlargement. No cervical adenopathy. LUNGS:   Normal respiratory effort with symmetrical lung expansion. Breath sounds clear. HEART:   There is a regular rate and rhythm. No murmur, rub, or gallop. There is no edema in the lower extremities. ABDOMEN:   Soft and non-tender. No hepatosplenomegaly. Bowel sounds are normal.     SKIN:   There are no rashes, cyanosis, jaundice, or ecchymosis present. EXTREMITIES:   The extremities are unremarkable without clubbing, cyanosis, joint inflammation, degenerative, or ischemic change. MUSCULOSKELETAL:   There is no abnormal tone, muscle atrophy, or abnormal movement present. NEURO:   The patient is alert and oriented to person, place, and time. Memory appears intact and mood is normal.  No gross sensorimotor deficits are present. ASSESSMENT:  (Medical Decision Making)         ICD-10-CM    1. HELEN (obstructive sleep apnea)  G47.33 DME - DURABLE MEDICAL EQUIPMENT -moderate sleep apnea with an AHI of 25.0 and lowest saturation of 60%. The pathophysiology of obstructive sleep apnea was reviewed with the patient. It's potential to promote severe neurologic, cardiac, pulmonary, and gastrointestinal problems was discussed.  Specifically, the increased incidence of hypertension, coronary artery disease, congestive heart failure, pulmonary hypertension, gastroesophageal reflux, pathologic hypersomnolence, memory loss, and glucose intolerance was related to the consequences of hypoxemia, hypercapnia, airway obstruction, and sympathetic overdrive. We also discussed the ability of nasal CPAP to correct these abnormalities through maintenance of a patent airway. Therapeutic options including surgery, oral appliances, and weight loss were also reviewed. Patient is in agreement with starting CPAP therapy as treatment option      2. Snoring  R06.83 Start CPAP therapy and utilize positional therapy to sleep in the side-lying position. 3. Hypersomnia  G47.10 Should improve with CPAP therapy      4. Obesity (BMI 30.0-34. 9)  E66.9 Patient can lose weight including ambulating 8-10,000 steps. Can Build Up Slowly as tolerated to this goal.    Also modifying dietary intake with decrease carbohydrates and sweets. Told to use avoid the P's --> Pizza, Pasta, Pastry, etc.  Increase fruits, vegetables, and lean meats e.g. [place] [place]), chicken or game meat. Patient is aware the goal is to consume less than 2000 figueroa/day, since patient is a nondiabetic. We discussed using the Car [employer] IT to count calories. Patient can police themselves. If the future, if still having issues can use a more regimented diet e.g. [place], [address] etc. Clinical correlation suggested. 5. Nocturnal hypoxemia  G47.34 May check overnight oximetry in the future after patient established on CPAP and doing well. Hypoxemia was resolved with CPAP during titration study              PLAN:  Start CPAP therapy 10 cm H2O with nightly compliance  New CPAP and supplies ordered  Recommendations as above  Appropriate handouts were given to patient including information on sleep apnea, sleep hygiene and PAP therapy.   Follow up with the Sleep Center will be 4 months or sooner if needed          Orders Placed This Encounter   Procedures    DME - [address] [facility]  Phone: [address] [address]  Dept: [phone]      Patient Name: Chavez Galindo  : 1961  Gender: female  Address: [address] [place] [address]  Patient phone number: [phone] (home) [phone] (work)      Primary Insurance: Payor: Judy Momino / Plan: Judy Faith / Product Type: *No Product type* /   Subscriber ID: [id number] - (Commercial)      AMB Supply Order  Order Details     DME Location: [facility]   Order Date: 2022   The primary encounter diagnosis was HELEN (obstructive sleep apnea). Diagnoses of Snoring and Hypersomnia were also pertinent to this visit.           (  X   )New Set-Up     CPAP machine   (  x   ) CPAP Unit  (     ) Auto CPAP Unit  (     ) BiLevel Unit  (     ) Auto BiLevel Unit  (     ) ASV   (     ) Bilevel ST    (     ) Oxygen Concentrator         Length of need: 12 months    Pressure: 10  cmH20  EPR: 1     Starting Ramp Pressure:   cm H20  Ramp Time: min  20    Patient had a diagnostic Apnea Hypopnea Index (AHI) of :  25.0    *SUPPLIES* Replace all as needed, or per coverage guidelines     Masks Type:    (     ) -Full Face Mask (1 per 3 mon)  (     ) -Full Mask (1 per month) Interface/Cushion      (  x   ) -Nasal Mask (1 per 3 mon)   <<<Zachariah&Juanito Talbot>>>  (  x   ) - Nasal Mask (1 per month) Interface/Cushion  (     ) -Pillow (2 per mon)  (  x   ) -Fcwyvafsw (1 per 6 mon)      _________________________________________________________________          Other Supplies:    (  X   )-Ktmrmtwf (1 per 6 mon)  ( X    )-Gkprmr Tubing (1 per 3 mon)  (  X   )- Disposable Filter (2 per mon)  (   X  )-Psjaio Humidifier (1 per year)     (  x   )-Hzkgycwbz (sometimes used with Full Face Mask) (1 per 6 mos)  (     )-Tubing-without heat (1 per 3 mos)  ( X   )-Non-Disposable Filter (1

## 2023-01-31 DIAGNOSIS — I10 ESSENTIAL HYPERTENSION: ICD-10-CM

## 2023-02-02 NOTE — TELEPHONE ENCOUNTER
Attempted to call. No answer. Message left. Advise patient Office Visit needs to be scheduled with Dr Rema Caballero.

## 2023-02-03 RX ORDER — LISINOPRIL 30 MG/1
TABLET ORAL
Qty: 90 TABLET | Refills: 0 | Status: SHIPPED | OUTPATIENT
Start: 2023-02-03

## 2023-02-03 NOTE — TELEPHONE ENCOUNTER
Pt advised of appt needed. States he can not take off of work at this time for an appt. Pt would like to know if Dr Enedina Laurent would please send in a refill.

## 2023-04-12 ENCOUNTER — OFFICE VISIT (OUTPATIENT)
Dept: FAMILY MEDICINE CLINIC | Age: 63
End: 2023-04-12
Payer: COMMERCIAL

## 2023-04-12 VITALS
WEIGHT: 167 LBS | RESPIRATION RATE: 16 BRPM | OXYGEN SATURATION: 98 % | DIASTOLIC BLOOD PRESSURE: 82 MMHG | SYSTOLIC BLOOD PRESSURE: 123 MMHG | TEMPERATURE: 99 F | HEIGHT: 68 IN | HEART RATE: 94 BPM | BODY MASS INDEX: 25.31 KG/M2

## 2023-04-12 DIAGNOSIS — H53.9 CHANGE IN VISION: Primary | ICD-10-CM

## 2023-04-12 DIAGNOSIS — I10 ESSENTIAL HYPERTENSION: ICD-10-CM

## 2023-04-12 PROCEDURE — 3074F SYST BP LT 130 MM HG: CPT | Performed by: FAMILY MEDICINE

## 2023-04-12 PROCEDURE — 3078F DIAST BP <80 MM HG: CPT | Performed by: FAMILY MEDICINE

## 2023-04-12 PROCEDURE — 99214 OFFICE O/P EST MOD 30 MIN: CPT | Performed by: FAMILY MEDICINE

## 2023-04-18 ENCOUNTER — TELEPHONE (OUTPATIENT)
Dept: FAMILY MEDICINE CLINIC | Age: 63
End: 2023-04-18

## 2023-04-18 DIAGNOSIS — H53.9 CHANGE IN VISION: Primary | ICD-10-CM

## 2023-04-18 NOTE — TELEPHONE ENCOUNTER
----- Message from [facility] sent at 4/17/2023  4:39 PM EDT -----  Subject: Message to Provider    QUESTIONS  Information for Provider? patient was seen on 04/12/23 and was referred to   eye specialist, had appt with eye specialist last week 04/14/23, they then   referred him to go to neuro, patient is asking for referral or to be seen   by provider again, he is not sure?  ---------------------------------------------------------------------------  --------------  [phone]  [phone]; OK to leave message on voicemail  ---------------------------------------------------------------------------  --------------  SCRIPT ANSWERS  Relationship to Patient?  Self

## 2023-04-19 NOTE — TELEPHONE ENCOUNTER
Pt is calling to check on the status of the neurologist in [facility]. Pt states he is concerned. Osman Cooks told him that he would call as soon as they receive a referral. He was having mini strokes. Please call Pt on the this. Please fax the Referral to this number [phone] - Dr. Edward Snow MD - [phone] believe this is the doctor? Please call Pt. He needs to have this taken care of before he has another stroke.  They say they will call him as soon as they receive the referral.

## 2023-04-19 NOTE — PROGRESS NOTES
Progress Note    Patient: Jackie Churchill MRN: [id number]  SSN: [id number]    YOB: 1960  Age: 58 y.o. Sex: male        Chief Complaint   Patient presents with    Hospital Follow Up     ED follow up- vision changes     he is a 58y.o. year old male who presents for follow up of ED encounter for acute vision change. Patient says he was at work when his vision started to blur in his right eye. He went to ED and had a CT and MRI. No acute findings noted. Patient declined admission for further work up. He has a hx of HTN which is well controlled today. Patient denies HA, dizziness, SOB, CP, abdominal pain, dysuria, acute weakness or paresthesia. .       Encounter Diagnoses   Name Primary? Change in vision Yes    Essential hypertension      BP Readings from Last 3 Encounters:   04/12/23 123/82   05/26/22 (!) 142/90   05/07/21 (!) 167/86       Patient Active Problem List   Diagnosis Code    HTN (hypertension) I10    GERD (gastroesophageal reflux disease) K21.9    Vitamin D deficiency E55.9     History reviewed. No pertinent surgical history. Social History     Socioeconomic History    Marital status:      Spouse name: Not on file    Number of children: Not on file    Years of education: Not on file    Highest education level: Not on file   Occupational History    Not on file   Tobacco Use    Smoking status: Never    Smokeless tobacco: Never   Substance and Sexual Activity    Alcohol use: No    Drug use: No    Sexual activity: Not on file   Other Topics Concern    Not on file   Social History Narrative    Not on file     Social Determinants of Health     Financial Resource Strain: Not on file   Food Insecurity: Not on file   Transportation Needs: Not on file   Physical Activity: Not on file   Stress: Not on file   Social Connections: Not on file   Intimate Partner Violence: Not on file   Housing Stability: Not on file     History reviewed. No pertinent family history.   Current Outpatient Medications   Medication Sig    aspirin 81 mg cap Take 81 mg by mouth.    lisinopriL (PRINIVIL, ZESTRIL) 30 mg tablet Take 1 tablet by mouth once daily    Blood Pressure Test Kit-Wrist kit     Blood Pressure Monitor kit Check blood pressure 2-3 times weekly. For Hypertension. No current facility-administered medications for this visit. Allergies   Allergen Reactions    Codeine Nausea and Vomiting       Review of Systems:  Constitutional: Negative for fatigue, malaise  HEENT: see HPI  Resp: Negative for cough, wheezing or SOB  CV: Negative for chest pain, dizziness or palpitations  GI: Negative for nausea or abdominal pain  MS: Negative for acute myalgias or arthralgias   Neuro: Negative for HA, weakness or paresthesia  Psych: Negative for depression or anxiety     Vitals:    04/12/23 1019   BP: 123/82   Pulse: 94   Resp: 16   Temp: 99 °F (37.2 °C)   SpO2: 98%   Weight: 167 lb (75.8 kg)   Height: 5' 8\" (1.727 m)       Physical Examination:  General: Well developed, well nourished, in no acute distress  Head: Normocephalic, atraumatic  Eyes: Sclera clear, EOMI, PERRL  Neck: Normal range of motion  Respiratory: Symmetrical, unlabored effort  Cardiovascular: Regular rate and rhythm  Extremities: Full range of motion, normal gait  Neurologic: No focal deficits  Psych: Active, alert and oriented. Affect appropriate       ICD-10-CM ICD-9-CM    1. Change in vision  H53.9 368.9 REFERRAL TO OPHTHALMOLOGY      2. Essential hypertension  I10 401.9           Plan of care:  Diagnoses were discussed in detail with patient. Medications reviewed and appropriate. Medication risks/benefits/side effects discussed with patient. Patient to continue current prescribed medications as written. All of the patient's questions were addressed and answered to apparent satisfaction. The patient understands and agrees with our plan of care.   The patient knows to call back if they have questions about the plan of care or if symptoms change. The patient received an After-Visit Summary which contains VS, diagnoses, orders, allergy and medication lists. Future Appointments   Date Time Provider Hany Rowan   5/10/2023  2:20 PM Allison Swanson MD [facility] BS AMB         Follow-up and Dispositions    Return in about 4 weeks (around 5/10/2023), or if symptoms worsen or fail to improve.

## 2023-06-16 ENCOUNTER — OFFICE VISIT (OUTPATIENT)
Facility: CLINIC | Age: 63
End: 2023-06-16

## 2023-06-16 VITALS
WEIGHT: 167 LBS | TEMPERATURE: 97.6 F | RESPIRATION RATE: 16 BRPM | DIASTOLIC BLOOD PRESSURE: 87 MMHG | HEART RATE: 55 BPM | SYSTOLIC BLOOD PRESSURE: 137 MMHG | BODY MASS INDEX: 25.31 KG/M2 | OXYGEN SATURATION: 99 % | HEIGHT: 68 IN

## 2023-06-16 DIAGNOSIS — Z00.00 ENCOUNTER FOR GENERAL ADULT MEDICAL EXAMINATION WITHOUT ABNORMAL FINDINGS: Primary | ICD-10-CM

## 2023-06-16 DIAGNOSIS — Z12.11 SCREEN FOR COLON CANCER: ICD-10-CM

## 2023-06-16 RX ORDER — ASPIRIN 81 MG/81MG
81 CAPSULE ORAL
COMMUNITY
Start: 2023-04-11

## 2023-06-16 SDOH — ECONOMIC STABILITY: INCOME INSECURITY: HOW HARD IS IT FOR YOU TO PAY FOR THE VERY BASICS LIKE FOOD, HOUSING, MEDICAL CARE, AND HEATING?: SOMEWHAT HARD

## 2023-06-16 SDOH — ECONOMIC STABILITY: FOOD INSECURITY: WITHIN THE PAST 12 MONTHS, THE FOOD YOU BOUGHT JUST DIDN'T LAST AND YOU DIDN'T HAVE MONEY TO GET MORE.: NEVER TRUE

## 2023-06-16 SDOH — ECONOMIC STABILITY: FOOD INSECURITY: WITHIN THE PAST 12 MONTHS, YOU WORRIED THAT YOUR FOOD WOULD RUN OUT BEFORE YOU GOT MONEY TO BUY MORE.: NEVER TRUE

## 2023-06-16 SDOH — ECONOMIC STABILITY: HOUSING INSECURITY
IN THE LAST 12 MONTHS, WAS THERE A TIME WHEN YOU DID NOT HAVE A STEADY PLACE TO SLEEP OR SLEPT IN A SHELTER (INCLUDING NOW)?: NO

## 2023-06-16 ASSESSMENT — PATIENT HEALTH QUESTIONNAIRE - PHQ9
SUM OF ALL RESPONSES TO PHQ QUESTIONS 1-9: 0
2. FEELING DOWN, DEPRESSED OR HOPELESS: 0
SUM OF ALL RESPONSES TO PHQ QUESTIONS 1-9: 0
SUM OF ALL RESPONSES TO PHQ9 QUESTIONS 1 & 2: 0
1. LITTLE INTEREST OR PLEASURE IN DOING THINGS: 0
SUM OF ALL RESPONSES TO PHQ QUESTIONS 1-9: 0
SUM OF ALL RESPONSES TO PHQ QUESTIONS 1-9: 0

## 2023-06-17 LAB
ALBUMIN SERPL-MCNC: 3.9 G/DL (ref 3.5–5)
ALBUMIN/GLOB SERPL: 1.1 (ref 1.1–2.2)
ALP SERPL-CCNC: 57 U/L (ref 45–117)
ALT SERPL-CCNC: 23 U/L (ref 12–78)
ANION GAP SERPL CALC-SCNC: 5 MMOL/L (ref 5–15)
AST SERPL-CCNC: 17 U/L (ref 15–37)
BILIRUB SERPL-MCNC: 0.9 MG/DL (ref 0.2–1)
BUN SERPL-MCNC: 18 MG/DL (ref 6–20)
BUN/CREAT SERPL: 20 (ref 12–20)
CALCIUM SERPL-MCNC: 9.4 MG/DL (ref 8.5–10.1)
CHLORIDE SERPL-SCNC: 104 MMOL/L (ref 97–108)
CHOLEST SERPL-MCNC: 149 MG/DL
CO2 SERPL-SCNC: 30 MMOL/L (ref 21–32)
CREAT SERPL-MCNC: 0.92 MG/DL (ref 0.7–1.3)
GLOBULIN SER CALC-MCNC: 3.4 G/DL (ref 2–4)
GLUCOSE SERPL-MCNC: 90 MG/DL (ref 65–100)
HDLC SERPL-MCNC: 49 MG/DL
HDLC SERPL: 3 (ref 0–5)
LDLC SERPL CALC-MCNC: 89.6 MG/DL (ref 0–100)
POTASSIUM SERPL-SCNC: 4.5 MMOL/L (ref 3.5–5.1)
PROT SERPL-MCNC: 7.3 G/DL (ref 6.4–8.2)
SODIUM SERPL-SCNC: 139 MMOL/L (ref 136–145)
TRIGL SERPL-MCNC: 52 MG/DL
VLDLC SERPL CALC-MCNC: 10.4 MG/DL

## 2023-06-23 LAB — HEMOCCULT STL QL IA: NEGATIVE

## 2023-06-23 NOTE — PROGRESS NOTES
Progress Note    Patient: Eliecer Orozco MRN: [id number]  SSN: [id number]    YOB: 1960  Age: 58 y.o. Sex: male        Chief Complaint   Patient presents with    Follow-up Chronic Condition    Hypertension     he is a 58y.o. year old male who presents for CPE. Patient with hx of HTN, GERD and vitamin D deficiency. Patient denies HA, dizziness, SOB, CP, abdominal pain, dysuria, acute myalgias or arthralgias. Encounter Diagnoses   Name Primary? Encounter for general adult medical examination without abnormal findings Yes    Screen for colon cancer      BP Readings from Last 3 Encounters:   06/16/23 137/87   05/26/22 (!) 142/90   05/07/21 (!) 167/86       Patient Active Problem List   Diagnosis    HTN (hypertension)    GERD (gastroesophageal reflux disease)    Vitamin D deficiency     History reviewed. No pertinent surgical history. Social History     Socioeconomic History    Marital status:      Spouse name: Not on file    Number of children: Not on file    Years of education: Not on file    Highest education level: Not on file   Occupational History    Not on file   Tobacco Use    Smoking status: Never    Smokeless tobacco: Never   Substance and Sexual Activity    Alcohol use: No    Drug use: No    Sexual activity: Not on file   Other Topics Concern    Not on file   Social History Narrative    Not on file     Social Determinants of Health     Financial Resource Strain: Medium Risk    Difficulty of Paying Living Expenses: Somewhat hard   Food Insecurity: No Food Insecurity    Worried About Running Out of Food in the Last Year: Never true    Ran Out of Food in the Last Year: Never true   Transportation Needs: Unknown    Lack of Transportation (Medical): Not on file    Lack of Transportation (Non-Medical):  No   Physical Activity: Not on file   Stress: Not on file   Social Connections: Not on file   Intimate Partner Violence: Not on file   Housing Stability: Unknown    Unable to Pay for

## 2023-08-16 ENCOUNTER — TELEPHONE (OUTPATIENT)
Facility: CLINIC | Age: 63
End: 2023-08-16

## 2023-08-16 NOTE — TELEPHONE ENCOUNTER
----- Message from [facility] sent at 8/16/2023  9:31 AM EDT -----  Subject: Message to Provider    QUESTIONS  Information for Provider? Pt needs an appt for removal of 11 staples on   the side of head. The staples were placed on 08/15/23 at [facility].  ---------------------------------------------------------------------------  --------------  [address] [place]  [phone]; OK to leave message on voicemail  ---------------------------------------------------------------------------  --------------  SCRIPT ANSWERS  Relationship to Patient? Self  (Is the patient requesting to see the provider for a procedure?)?  Yes

## 2023-08-16 NOTE — TELEPHONE ENCOUNTER
Patient called, appt scheduled for staple removal. Stated he was at work and struck in head. Records requested from [facility].

## 2023-08-28 ENCOUNTER — OFFICE VISIT (OUTPATIENT)
Facility: CLINIC | Age: 63
End: 2023-08-28
Payer: COMMERCIAL

## 2023-08-28 VITALS
RESPIRATION RATE: 18 BRPM | DIASTOLIC BLOOD PRESSURE: 70 MMHG | HEART RATE: 66 BPM | WEIGHT: 165 LBS | BODY MASS INDEX: 25.01 KG/M2 | OXYGEN SATURATION: 99 % | HEIGHT: 68 IN | SYSTOLIC BLOOD PRESSURE: 119 MMHG | TEMPERATURE: 98.3 F

## 2023-08-28 DIAGNOSIS — S01.01XD LACERATION OF SCALP, SUBSEQUENT ENCOUNTER: Primary | ICD-10-CM

## 2023-08-28 DIAGNOSIS — Z48.02 VISIT FOR SUTURE REMOVAL: ICD-10-CM

## 2023-08-28 PROCEDURE — 99212 OFFICE O/P EST SF 10 MIN: CPT | Performed by: FAMILY MEDICINE

## 2023-08-28 PROCEDURE — 3074F SYST BP LT 130 MM HG: CPT | Performed by: FAMILY MEDICINE

## 2023-08-28 PROCEDURE — 3078F DIAST BP <80 MM HG: CPT | Performed by: FAMILY MEDICINE

## 2023-08-28 RX ORDER — LISINOPRIL 30 MG/1
30 TABLET ORAL DAILY
Qty: 90 TABLET | Refills: 1 | Status: CANCELLED | OUTPATIENT
Start: 2023-08-28

## 2023-08-28 NOTE — PROGRESS NOTES
[facility]  Chief Complaint   Patient presents with    Suture / Staple Removal       History of Present Illness:   Aimee Kate is a 61 y.o. male       HPI:  Patient here for staple removal.  He was at work and cut his head on a piece of metal.  Supervisor took him to urgent care, he was transferred to [facility] ER where he had staples placed and was given a tetanus shot. That was approximately 2 weeks ago. Area has healed fine and he has minimal pain. No drainage. Health Maintenance  Health Maintenance Due   Topic Date Due    HIV screen  Never done    Shingles vaccine (1 of 2) Never done    COVID-19 Vaccine (3 - Booster for Joshua series) 03/03/2022    Flu vaccine (1) Never done       Past Medical, Family, and Social History:     Past Medical History:   Diagnosis Date    HTN (hypertension) 5/24/2010      History reviewed. No pertinent surgical history. Current Outpatient Medications on File Prior to Visit   Medication Sig Dispense Refill    Cholecalciferol (VITAMIN D3 PO) Take 1 tablet by mouth daily      Aspirin (VAZALORE) 81 MG CAPS Take 81 mg by mouth      Blood Pressure Monitor KIT Check blood pressure 2-3 times weekly. For Hypertension. ibuprofen (ADVIL;MOTRIN) 200 MG tablet Take by mouth      lisinopril (PRINIVIL;ZESTRIL) 30 MG tablet Take 1 tablet by mouth once daily       No current facility-administered medications on file prior to visit. Patient Active Problem List   Diagnosis    HTN (hypertension)    GERD (gastroesophageal reflux disease)    Vitamin D deficiency       Social History     Socioeconomic History    Marital status:       Spouse name: None    Number of children: None    Years of education: None    Highest education level: None   Tobacco Use    Smoking status: Never    Smokeless tobacco: Never   Substance and Sexual Activity    Alcohol use: No    Drug use: No     Social Determinants of Health     Financial Resource Strain: Medium Risk

## 2023-08-28 NOTE — PATIENT INSTRUCTIONS
F/U ASAP with MD/ED for any spreading redness, warmth, fevers, chills, increasing drainage, pain or other perceived signs of worsening infection or if current symptoms fail to improve and fully resolve as anticipated.

## 2023-08-30 RX ORDER — LISINOPRIL 30 MG/1
30 TABLET ORAL DAILY
Qty: 90 TABLET | Refills: 1 | Status: SHIPPED | OUTPATIENT
Start: 2023-08-30

## 2024-03-08 RX ORDER — LISINOPRIL 30 MG/1
30 TABLET ORAL DAILY
Qty: 90 TABLET | Refills: 1 | Status: SHIPPED | OUTPATIENT
Start: 2024-03-08

## 2024-03-08 NOTE — TELEPHONE ENCOUNTER
Called patient, no answer left msg. Advise patient Office Visit needs to be scheduled with Dr Burnett. Letter sent.